# Patient Record
Sex: FEMALE | Race: WHITE
[De-identification: names, ages, dates, MRNs, and addresses within clinical notes are randomized per-mention and may not be internally consistent; named-entity substitution may affect disease eponyms.]

---

## 2017-04-29 ENCOUNTER — HOSPITAL ENCOUNTER (EMERGENCY)
Dept: HOSPITAL 10 - FTE | Age: 32
Discharge: HOME | End: 2017-04-29
Payer: MEDICAID

## 2017-04-29 VITALS
DIASTOLIC BLOOD PRESSURE: 76 MMHG | TEMPERATURE: 98 F | SYSTOLIC BLOOD PRESSURE: 120 MMHG | RESPIRATION RATE: 16 BRPM | HEART RATE: 69 BPM

## 2017-04-29 VITALS — BODY MASS INDEX: 37.24 KG/M2 | WEIGHT: 160.94 LBS | HEIGHT: 55 IN

## 2017-04-29 DIAGNOSIS — R07.89: ICD-10-CM

## 2017-04-29 DIAGNOSIS — R42: Primary | ICD-10-CM

## 2017-04-29 LAB
ADD SCAN DIFF: NO
ADD UMIC: YES
ALBUMIN SERPL-MCNC: 4.1 G/DL (ref 3.3–4.9)
ALBUMIN/GLOB SERPL: 1.24 {RATIO}
ALP SERPL-CCNC: 63 IU/L (ref 42–121)
ALT SERPL-CCNC: 29 IU/L (ref 13–69)
ANION GAP SERPL CALC-SCNC: 15 MMOL/L (ref 8–16)
APTT BLD: 30.3 SEC (ref 25–35)
AST SERPL-CCNC: 19 IU/L (ref 15–46)
BACTERIA #/AREA URNS HPF: (no result) /[HPF]
BASOPHILS # BLD AUTO: 0 10^3/UL (ref 0–0.1)
BASOPHILS NFR BLD: 0.3 % (ref 0–2)
BILIRUB DIRECT SERPL-MCNC: 0 MG/DL (ref 0–0.2)
BILIRUB SERPL-MCNC: 0.2 MG/DL (ref 0.2–1.3)
BUN SERPL-MCNC: 13 MG/DL (ref 7–20)
CALCIUM SERPL-MCNC: 9 MG/DL (ref 8.4–10.2)
CHLORIDE SERPL-SCNC: 102 MMOL/L (ref 97–110)
CO2 SERPL-SCNC: 28 MMOL/L (ref 21–31)
COLOR UR: (no result)
CREAT SERPL-MCNC: 0.7 MG/DL (ref 0.44–1)
D DIMER PPP FEU-MCNC: < 220 NG/ML (ref ?–460)
EOSINOPHIL # BLD: 0 10^3/UL (ref 0–0.5)
EOSINOPHIL NFR BLD: 0.2 % (ref 0–7)
ERYTHROCYTE [DISTWIDTH] IN BLOOD BY AUTOMATED COUNT: 15.4 % (ref 11.5–14.5)
GLOBULIN SER-MCNC: 3.3 G/DL (ref 1.3–3.2)
GLUCOSE SERPL-MCNC: 89 MG/DL (ref 70–220)
GLUCOSE UR STRIP-MCNC: NEGATIVE %
HCT VFR BLD CALC: 31.3 % (ref 37–47)
HGB BLD-MCNC: 9.2 G/DL (ref 12–16)
INR PPP: 0.95
KETONES UR STRIP.AUTO-MCNC: NEGATIVE MG/DL
LYMPHOCYTES # BLD AUTO: 2.6 10^3/UL (ref 0.8–2.9)
LYMPHOCYTES NFR BLD AUTO: 26.9 % (ref 15–51)
MCH RBC QN AUTO: 20.7 PG (ref 29–33)
MCHC RBC AUTO-ENTMCNC: 29.4 G/DL (ref 32–37)
MCV RBC AUTO: 70.3 FL (ref 82–101)
MONOCYTES # BLD: 0.6 10^3/UL (ref 0.3–0.9)
MONOCYTES NFR BLD: 6.5 % (ref 0–11)
NEUTROPHILS # BLD: 6.2 10^3/UL (ref 1.6–7.5)
NEUTROPHILS NFR BLD AUTO: 65.9 % (ref 39–77)
NITRITE UR QL STRIP.AUTO: NEGATIVE
NRBC # BLD MANUAL: 0 10^3/UL (ref 0–0)
NRBC BLD QL: 0 /100WBC (ref 0–0)
PLATELET # BLD: 382 10^3/UL (ref 140–415)
PMV BLD AUTO: 10.2 FL (ref 7.4–10.4)
POTASSIUM SERPL-SCNC: 3.8 MMOL/L (ref 3.5–5.1)
PROT SERPL-MCNC: 7.4 G/DL (ref 6.1–8.1)
PROTHROMBIN TIME: 12.7 SEC (ref 12.2–14.2)
PT RATIO: 1
RBC # BLD AUTO: 4.45 10^6/UL (ref 4.2–5.4)
RBC # UR AUTO: NEGATIVE /UL
RBC #/AREA URNS HPF: (no result) /HPF
SODIUM SERPL-SCNC: 141 MMOL/L (ref 135–144)
SQUAMOUS #/AREA URNS HPF: (no result) /[HPF]
URINE BILIRUBIN (DIP): NEGATIVE
URINE TOTAL PROTEIN (DIP): NEGATIVE
UROBILINOGEN UR STRIP-ACNC: (no result) (ref 0.1–1)
WBC # BLD AUTO: 9.5 10^3/UL (ref 4.8–10.8)
WBC # UR STRIP: (no result) /UL

## 2017-04-29 PROCEDURE — 85378 FIBRIN DEGRADE SEMIQUANT: CPT

## 2017-04-29 PROCEDURE — 80053 COMPREHEN METABOLIC PANEL: CPT

## 2017-04-29 PROCEDURE — 96374 THER/PROPH/DIAG INJ IV PUSH: CPT

## 2017-04-29 PROCEDURE — 81003 URINALYSIS AUTO W/O SCOPE: CPT

## 2017-04-29 PROCEDURE — 93005 ELECTROCARDIOGRAM TRACING: CPT

## 2017-04-29 PROCEDURE — 36415 COLL VENOUS BLD VENIPUNCTURE: CPT

## 2017-04-29 PROCEDURE — 85025 COMPLETE CBC W/AUTO DIFF WBC: CPT

## 2017-04-29 PROCEDURE — 81001 URINALYSIS AUTO W/SCOPE: CPT

## 2017-04-29 PROCEDURE — 85610 PROTHROMBIN TIME: CPT

## 2017-04-29 PROCEDURE — 71010: CPT

## 2017-04-29 PROCEDURE — 85730 THROMBOPLASTIN TIME PARTIAL: CPT

## 2017-04-29 PROCEDURE — 83690 ASSAY OF LIPASE: CPT

## 2017-04-29 NOTE — RADRPT
PROCEDURE:   XR Chest. 

 

CLINICAL INDICATION:   Abdominal pain.

 

TECHNIQUE:   Single frontal view. 

 

COMPARISON:   11/06/2016. 

 

FINDINGS:

The lungs are clear. 

The heart size is normal. 

There is no pleural effusion. 

There is no pneumothorax.   

 

IMPRESSION:

1.  Normal chest radiograph.

2.  No change from 11/06/2016.

 

RPTAT: QQ

_____________________________________________ 

.Mulugeta Daniel MD, MD           Date    Time 

Electronically viewed and signed by .Mulugeta Daniel MD, MD on 04/29/2017 19:03 

 

D:  04/29/2017 19:03  T:  04/29/2017 19:03

.R/

## 2017-04-29 NOTE — ERD
ER Documentation


Chief Complaint


Date/Time


DATE: 4/29/17 


TIME: 19:47


Chief Complaint


DIZZINESS  X 3 DAYS





HPI


This 31-year-old female presents with sensation of left chest wall pain for 

last 3 days.  She has some dizziness today and felt like she was going to pass 

out.  She denies any head injury, vomiting, shortness of breath, fevers, neck 

stiffness, rashes, additional complaints.  Patient is a history significant for 

anemia related to possible gastritis or heavy menstrual periods.  She is taking 

iron at home.  She had a hemoglobin of in the mid eights last week with her 

primary doctor.





ROS


All systems reviewed and are negative except as per history of present illness.





Medications


Home Meds


Active Scripts


Acetaminophen* (Tylophen*) 500 Mg Capsule, 1 CAP PO Q6H Y for PAIN AND OR 

ELEVATED TEMP, #15 CAP


   Prov:DARELL HANSON MD         4/29/17


Meclizine Hcl* (Antivert*) 12.5 Mg Tab, 12.5 MG PO Q6H Y for DIZZINESS, #20 TAB


   Prov:EMELY HUFF PA-C         11/6/16


Ondansetron Hcl* (Zofran*) 4 Mg Tablet, 4 MG PO Q6H for NAUSEA AND/OR VOMITING, 

#30 TAB


   Prov:EMELY HUFF PA-C         11/6/16


Ibuprofen* (Motrin*) 400 Mg Tab, 400 MG PO Q6, #30 TAB


   Prov:EMELY HUFF PA-C         11/6/16


Ferrous Sulfate* (Ferrous Sulfate*) 325 Mg Tabec, 325 MG PO BID for 60 Days, TAB


   Prov:SHERRI STEWART NP         10/8/16


Pantoprazole* (Pantoprazole*) 40 Mg Tablet.dr, 40 MG PO BID@06,18 for 60 Days


   Prov:SHERRI STEWART NP         10/8/16





Allergies


Allergies:  


Coded Allergies:  


     No Known Allergy (Unverified , 10/4/16)





PMhx/Soc


Medical and Surgical Hx:  pt denies Medical Hx, pt denies Surgical Hx


History of Surgery:  No


Anesthesia Reaction:  No (UNKNOWN )


Hx Neurological Disorder:  No


Hx Respiratory Disorders:  No


Hx Cardiac Disorders:  No


Hx Psychiatric Problems:  No


Hx Miscellaneous Medical Probl:  No


Hx Alcohol Use:  No (DENIES )


Hx Substance Use:  No


Hx Tobacco Use:  No


Smoking Status:  Never smoker





Physical Exam


Vitals





Vital Signs








  Date Time  Temp Pulse Resp B/P Pulse Ox O2 Delivery O2 Flow Rate FiO2


 


4/29/17 18:03 98.0 81 18 144/91 99   








Physical Exam


Const:  [] Alert, non-ill-appearing.


Head:   Atraumatic 


Eyes:    Normal Conjunctiva


ENT:    Normal External Ears, Nose and Mouth.


Neck:               Full range of motion..~ No meningismus.


Resp:    Clear to auscultation bilaterally


Cardio:    Regular rate and rhythm, no murmurs.  Reproducible left chest wall 

tenderness in the costochondral area.


Abd:    Soft, non tender, non distended. Normal bowel sounds


Skin:    No petechiae or rashes


Back:    No midline or flank tenderness


Ext:    No cyanosis, or edema.  No calf swelling or Homans sign.


Neur:    Awake and alert


Psych:    Normal Mood and Affect


Result Diagram:  


4/29/17 1849                                                                   

             4/29/17 1849





Results 24 hrs





 Laboratory Tests








Test


  4/29/17


18:49 4/29/17


18:50


 


White Blood Count 9.510^3/ul  


 


Red Blood Count 4.4510^6/ul  


 


Hemoglobin 9.2g/dl  


 


Hematocrit 31.3%  


 


Mean Corpuscular Volume 70.3fl  


 


Mean Corpuscular Hemoglobin 20.7pg  


 


Mean Corpuscular Hemoglobin


Concent 29.4g/dl 


  


 


 


Red Cell Distribution Width 15.4%  


 


Platelet Count 18697^3/UL  


 


Mean Platelet Volume 10.2fl  


 


Neutrophils % 65.9%  


 


Lymphocytes % 26.9%  


 


Monocytes % 6.5%  


 


Eosinophils % 0.2%  


 


Basophils % 0.3%  


 


Nucleated Red Blood Cells % 0.0/100WBC  


 


Neutrophils # 6.210^3/ul  


 


Lymphocytes # 2.610^3/ul  


 


Monocytes # 0.610^3/ul  


 


Eosinophils # 0.010^3/ul  


 


Basophils # 0.010^3/ul  


 


Nucleated Red Blood Cells # 0.010^3/ul  


 


Prothrombin Time 12.7Sec  


 


Prothrombin Time Ratio 1.0  


 


INR International Normalized


Ratio 0.95 


  


 


 


Activated Partial


Thromboplast Time 30.3Sec 


  


 


 


D-Dimer < 220.00ng/ml  


 


D-Dimer Comment   


 


Sodium Level 141mmol/L  


 


Potassium Level 3.8mmol/L  


 


Chloride Level 102mmol/L  


 


Carbon Dioxide Level 28mmol/L  


 


Anion Gap 15  


 


Blood Urea Nitrogen 13mg/dl  


 


Creatinine 0.70mg/dl  


 


Glucose Level 89mg/dl  


 


Calcium Level 9.0mg/dl  


 


Total Bilirubin 0.2mg/dl  


 


Direct Bilirubin 0.00mg/dl  


 


Indirect Bilirubin 0.2mg/dl  


 


Aspartate Amino Transf


(AST/SGOT) 19IU/L 


  


 


 


Alanine Aminotransferase


(ALT/SGPT) 29IU/L 


  


 


 


Alkaline Phosphatase 63IU/L  


 


Total Protein 7.4g/dl  


 


Albumin 4.1g/dl  


 


Globulin 3.30g/dl  


 


Albumin/Globulin Ratio 1.24  


 


Lipase 95U/L  


 


Urine Color  LT. YELLOW 


 


Urine Clarity  CLEAR 


 


Urine pH  5.5 


 


Urine Specific Gravity  <=1.005 


 


Urine Ketones  NEGATIVE 


 


Urine Nitrite  NEGATIVE 


 


Urine Bilirubin  NEGATIVE 


 


Urine Urobilinogen  0.2  E.U./dL 


 


Urine Leukocyte Esterase  1+ 


 


Urine Microscopic RBC  Pending 


 


Urine Microscopic WBC  Pending 


 


Urine Hemoglobin  NEGATIVE 


 


Urine Glucose  NEGATIVE% 


 


Urine Total Protein  NEGATIVE 








 Current Medications








 Medications


  (Trade)  Dose


 Ordered  Sig/Krystle


 Route


 PRN Reason  Start Time


 Stop Time Status Last Admin


Dose Admin


 


 Ondansetron HCl


  (Zofran Inj)  4 mg  ONCE  STAT


 IV


   4/29/17 18:30


 4/29/17 18:33 DC 4/29/17 19:04


 











Procedures/MDM


CBC shows white blood cell count of 9.5 and hemoglobin 9.2 and anemia which is 

microcytic.  CMP is normal.  Urine is negative for infection, glucose nitrites 

and hCG is negative.  D-dimer is negative.


 EKG: 


Rate/Rhythm:             [Normal Sinus Rhythm] rate equals 66


QRS, ST, T-waves:    [No changes consistent w/ acute ischemia]


Impression:      [No evidence of ischemia or arrhythmia]





Chest X-ray 1V Interpreted by me:


Soft Tissue:                                               No acute 

abnormalities


Bones:                                                    No acute abnormalities


Mediastinum/Cardiac Silhouette/Lungs:     [No acute abnormalities].  Impression-

normal 1 view chest x-ray








Patient was given Tylenol 650 mg by mouth.  Patient presents with left chest 

wall pain and signs of costochondritis.  There is no signs to suggest pneumonia

, PE, life-threatening arrhythmias, anemia sufficient to cause patient's 

presenting complaints.  Patient was discharged home instructions for last, 

fluids and further observation and Tylenol for pain.  The patient was stable 

with no new complaints during the ER course. Clinically, there is no current 

evidence to suggest meningitis, sepsis, acute abdomen, pneumonia, acute 

coronary syndrome, pulmonary embolism, or any other emergent condition 

appearing to require further evaluation or hospitalization. The patient should 

certainly return for any new or worsening symptoms per the aftercare 

instructions. They should otherwise follow-up with her primary care doctor for 

reevaluation this week.





Departure


Diagnosis:  


 Primary Impression:  


 Dizziness


 Additional Impression:  


 Chest wall pain


Condition:  Stable


Patient Instructions:  Anemia, Iron Deficiency (Adult), Chest Wall Pain, 

Costochondritis, Dizziness, Unk Cause





Additional Instructions:  


Examines normal hoy. Cheque otro vez con fisher doctor primario en el proximo shah 

or regresa para mas o nueva simptomas. CONTINUA LLERON. KATHLEEN AGUA EN CASA.











DARELL HANSON MD Apr 29, 2017 19:49

## 2017-09-11 ENCOUNTER — HOSPITAL ENCOUNTER (OUTPATIENT)
Dept: HOSPITAL 10 - E/R | Age: 32
Setting detail: OBSERVATION
LOS: 1 days | Discharge: HOME | End: 2017-09-12
Attending: INTERNAL MEDICINE | Admitting: INTERNAL MEDICINE
Payer: MEDICAID

## 2017-09-11 VITALS — HEART RATE: 75 BPM | DIASTOLIC BLOOD PRESSURE: 77 MMHG | SYSTOLIC BLOOD PRESSURE: 115 MMHG | RESPIRATION RATE: 18 BRPM

## 2017-09-11 VITALS — SYSTOLIC BLOOD PRESSURE: 111 MMHG | DIASTOLIC BLOOD PRESSURE: 72 MMHG | HEART RATE: 72 BPM | RESPIRATION RATE: 18 BRPM

## 2017-09-11 VITALS
BODY MASS INDEX: 27.18 KG/M2 | BODY MASS INDEX: 27.18 KG/M2 | HEIGHT: 65 IN | WEIGHT: 163.14 LBS | HEIGHT: 65 IN | WEIGHT: 163.14 LBS

## 2017-09-11 VITALS — SYSTOLIC BLOOD PRESSURE: 119 MMHG | RESPIRATION RATE: 18 BRPM | DIASTOLIC BLOOD PRESSURE: 67 MMHG

## 2017-09-11 VITALS — DIASTOLIC BLOOD PRESSURE: 75 MMHG | HEART RATE: 74 BPM | SYSTOLIC BLOOD PRESSURE: 118 MMHG | RESPIRATION RATE: 18 BRPM

## 2017-09-11 VITALS — TEMPERATURE: 98.7 F

## 2017-09-11 DIAGNOSIS — D50.9: Primary | ICD-10-CM

## 2017-09-11 LAB
%HYPO/RBC NFR BLD AUTO: (no result) % (ref 0–0)
ABNORMAL IP MESSAGE: 1
ALBUMIN SERPL-MCNC: 3.6 G/DL (ref 3.3–4.9)
ALBUMIN/GLOB SERPL: 1.2 {RATIO}
ALP SERPL-CCNC: 63 IU/L (ref 42–121)
ALT SERPL-CCNC: 28 IU/L (ref 13–69)
ANION GAP SERPL CALC-SCNC: 10 MMOL/L (ref 8–16)
APTT BLD: 28.1 SEC (ref 25–35)
AST SERPL-CCNC: 20 IU/L (ref 15–46)
BILIRUB DIRECT SERPL-MCNC: 0 MG/DL (ref 0–0.2)
BILIRUB SERPL-MCNC: 0.1 MG/DL (ref 0.2–1.3)
BUN SERPL-MCNC: 11 MG/DL (ref 7–20)
CALCIUM SERPL-MCNC: 8.6 MG/DL (ref 8.4–10.2)
CHLORIDE SERPL-SCNC: 106 MMOL/L (ref 97–110)
CO2 SERPL-SCNC: 27 MMOL/L (ref 21–31)
CREAT SERPL-MCNC: 0.76 MG/DL (ref 0.44–1)
ERYTHROCYTE [DISTWIDTH] IN BLOOD BY AUTOMATED COUNT: 17.8 % (ref 11.5–14.5)
GLOBULIN SER-MCNC: 3 G/DL (ref 1.3–3.2)
GLUCOSE SERPL-MCNC: 95 MG/DL (ref 70–220)
HCT VFR BLD CALC: 24.5 % (ref 37–47)
HGB BLD-MCNC: 6.7 G/DL (ref 12–16)
INR PPP: 1
LYMPHOCYTES # BLD AUTO: 2.4 10^3/UL (ref 0.8–2.9)
MCH RBC QN AUTO: 16.8 PG (ref 29–33)
MCHC RBC AUTO-ENTMCNC: 27.3 G/DL (ref 32–37)
MCV RBC AUTO: 61.6 FL (ref 82–101)
MONOCYTES # BLD: 0.5 10^3/UL (ref 0.3–0.9)
MONOCYTES % (M): 7 % (ref 0–11)
NRBC BLD AUTO-RTO: 0 /100WBC (ref 0–0)
OVALOCYTES BLD QL SMEAR: (no result) (ref 0–0)
PLATELET # BLD: 492 10^3/UL (ref 140–415)
PMV BLD AUTO: 9.8 FL (ref 7.4–10.4)
POSITIVE DIFF: (no result)
POTASSIUM SERPL-SCNC: 3.7 MMOL/L (ref 3.5–5.1)
PROT SERPL-MCNC: 6.6 G/DL (ref 6.1–8.1)
PROTHROMBIN TIME: 13.2 SEC (ref 12.2–14.2)
PT RATIO: 1
RBC # BLD AUTO: 3.98 10^6/UL (ref 4.2–5.4)
SODIUM SERPL-SCNC: 139 MMOL/L (ref 135–144)
WBC # BLD AUTO: 6.7 10^3/UL (ref 4.8–10.8)

## 2017-09-11 PROCEDURE — 85025 COMPLETE CBC W/AUTO DIFF WBC: CPT

## 2017-09-11 PROCEDURE — 84703 CHORIONIC GONADOTROPIN ASSAY: CPT

## 2017-09-11 PROCEDURE — 86901 BLOOD TYPING SEROLOGIC RH(D): CPT

## 2017-09-11 PROCEDURE — 83735 ASSAY OF MAGNESIUM: CPT

## 2017-09-11 PROCEDURE — P9016 RBC LEUKOCYTES REDUCED: HCPCS

## 2017-09-11 PROCEDURE — 86920 COMPATIBILITY TEST SPIN: CPT

## 2017-09-11 PROCEDURE — 84100 ASSAY OF PHOSPHORUS: CPT

## 2017-09-11 PROCEDURE — 36415 COLL VENOUS BLD VENIPUNCTURE: CPT

## 2017-09-11 PROCEDURE — 80053 COMPREHEN METABOLIC PANEL: CPT

## 2017-09-11 PROCEDURE — 85730 THROMBOPLASTIN TIME PARTIAL: CPT

## 2017-09-11 PROCEDURE — 85610 PROTHROMBIN TIME: CPT

## 2017-09-11 PROCEDURE — 96365 THER/PROPH/DIAG IV INF INIT: CPT

## 2017-09-11 PROCEDURE — 80048 BASIC METABOLIC PNL TOTAL CA: CPT

## 2017-09-11 PROCEDURE — 96375 TX/PRO/DX INJ NEW DRUG ADDON: CPT

## 2017-09-11 PROCEDURE — 86850 RBC ANTIBODY SCREEN: CPT

## 2017-09-11 PROCEDURE — 86900 BLOOD TYPING SEROLOGIC ABO: CPT

## 2017-09-11 PROCEDURE — 82728 ASSAY OF FERRITIN: CPT

## 2017-09-11 PROCEDURE — 82270 OCCULT BLOOD FECES: CPT

## 2017-09-11 PROCEDURE — 83036 HEMOGLOBIN GLYCOSYLATED A1C: CPT

## 2017-09-11 PROCEDURE — 36430 TRANSFUSION BLD/BLD COMPNT: CPT

## 2017-09-11 PROCEDURE — 83540 ASSAY OF IRON: CPT

## 2017-09-11 NOTE — ERA
ER Documentation


Chief Complaint


Date/Time


DATE: 9/11/17 


TIME: 14:40


Chief Complaint


 LOW HEMAGLOBIN





HPI


The patient is a 41-year-old female, presenting because of low hemoglobin of 6 

3 days ago at the clinic.  She had history of anemia, last blood transfusion 

was about a year ago.  She complains of chronic generalized weakness, denies 

syncope, near syncope, neck pain, chest pain, dyspnea, abdominal pain, vomiting

, dysuria, diarrhea, hematuria, hematochezia.  She does not smoke nor drink





Past medical history: Anemia, vitamin D deficiency, history of esophagitis, 

gastritis, duodenitis





Past surgical history none





ROS


All systems reviewed and are negative except as per history of present illness.





Medications


Home Meds


Active Scripts


Ferrous Sulfate* (Ferrous Sulfate*) 325 Mg Tabec, 325 MG PO BID for 60 Days, TAB


   Prov:SHERRI STEWART NP         10/8/16


Reported Medications


Ascorbic Acid (Vitamin C) Unknown Strength Tab, 1 TAB PO TID, TAB


   9/11/17


Discontinued Scripts


Pantoprazole* (Protonix*) 40 Mg Tablet., 40 MG PO DAILY, #30 TAB


   Prov:DARELL HANSON MD         4/29/17


Acetaminophen* (Tylophen*) 500 Mg Capsule, 1 CAP PO Q6H Y for PAIN AND OR 

ELEVATED TEMP, #15 CAP


   Prov:DARELL HANSON MD         4/29/17


Meclizine Hcl* (Antivert*) 12.5 Mg Tab, 12.5 MG PO Q6H Y for DIZZINESS, #20 TAB


   Prov:EMELY HUFF PA-C         11/6/16


Ondansetron Hcl* (Zofran*) 4 Mg Tablet, 4 MG PO Q6H for NAUSEA AND/OR VOMITING, 

#30 TAB


   Prov:EMELY HUFF-C         11/6/16


Ibuprofen* (Motrin*) 400 Mg Tab, 400 MG PO Q6, #30 TAB


   Prov:EMELY HUFF-C         11/6/16


Pantoprazole* (Pantoprazole*) 40 Mg Tablet., 40 MG PO BID@06,18 for 60 Days


   Prov:SHERRI STEWART NP         10/8/16





Allergies


Allergies:  


Coded Allergies:  


     No Known Allergy (Unverified , 9/11/17)





PMhx/Soc


History of Surgery:  No


Anesthesia Reaction:  No (UNKNOWN )


Hx Neurological Disorder:  No


Hx Respiratory Disorders:  No


Hx Cardiac Disorders:  No


Hx Psychiatric Problems:  No


Hx Miscellaneous Medical Probl:  No


Hx Alcohol Use:  No (DENIES )


Hx Substance Use:  No


Hx Tobacco Use:  No





Physical Exam


Vitals





Vital Signs








  Date Time  Temp Pulse Resp B/P Pulse Ox O2 Delivery O2 Flow Rate FiO2


 


9/11/17 15:30      Nasal Cannula 2 


 


9/11/17 13:23 99.0 106 22 130/84 99   








Physical Exam


 Const:      No acute distress.


 Head:        Atraumatic.


 Eyes:       Normal Conjunctiva.


 ENT:         Normal External Ears, Nose and Mouth.


 Neck:        Full range of motion.  No meningismus.


 Resp:         Clear to auscultation bilaterally.


 Cardio:       Regular rate and rhythm.


 Abd:         Soft,  non distended, normal bowel sounds, non tender.


 Skin:         No petechiae or rashes.


 Back:        No midline or flank tenderness.


 Ext:          No cyanosis, or edema.


 Neur:        Awake and alert. No focal deficit


 Psych:        Normal Mood and Affect.


Result Diagram:  


9/11/17 1502                                                                   

             9/11/17 1502





Results 24 hrs





 Laboratory Tests








Test


  9/11/17


15:02 9/11/17


15:10


 


White Blood Count 6.710^3/ul  


 


Red Blood Count 3.9810^6/ul  


 


Hemoglobin 6.7g/dl  


 


Hematocrit 24.5%  


 


Mean Corpuscular Volume 61.6fl  


 


Mean Corpuscular Hemoglobin 16.8pg  


 


Mean Corpuscular Hemoglobin


Concent 27.3g/dl 


  


 


 


Red Cell Distribution Width 17.8%  


 


Platelet Count 50763^3/UL  


 


Mean Platelet Volume 9.8fl  


 


Neutrophils % %  


 


Segmented Neutrophils %


(Manual) 57% 


  


 


 


Lymphocytes % %  


 


Lymphocytes % (Manual) 36%  


 


Monocytes % %  


 


Monocytes % (Manual) 7%  


 


Eosinophils % %  


 


Basophils % %  


 


Nucleated Red Blood Cells % 0.0/100WBC  


 


Neutrophils # (Manual) 10^3/ul  


 


Absolute Lymphocytes (Manual) 2.410^3/ul  


 


Lymphocytes # 2.410^3/ul  


 


Monocytes # 0.510^3/ul  


 


Absolute Monocytes (Manual) 0.410^3/ul  


 


Eosinophils # 10^3/ul  


 


Basophils # 10^3/ul  


 


Nucleated Red Blood Cells # 10^3/ul  


 


Hypochromasia 2+  


 


Ovalocytes 1+  


 


Prothrombin Time 13.2Sec  


 


Prothrombin Time Ratio 1.0  


 


INR International Normalized


Ratio 1.00 


  


 


 


Activated Partial


Thromboplast Time 28.1Sec 


  


 


 


Sodium Level 139mmol/L  


 


Potassium Level 3.7mmol/L  


 


Chloride Level 106mmol/L  


 


Carbon Dioxide Level 27mmol/L  


 


Anion Gap 10  


 


Blood Urea Nitrogen 11mg/dl  


 


Creatinine 0.76mg/dl  


 


Glucose Level 95mg/dl  


 


Calcium Level 8.6mg/dl  


 


Total Bilirubin 0.1mg/dl  


 


Direct Bilirubin 0.00mg/dl  


 


Indirect Bilirubin 0.1mg/dl  


 


Aspartate Amino Transf


(AST/SGOT) 20IU/L 


  


 


 


Alanine Aminotransferase


(ALT/SGPT) 28IU/L 


  


 


 


Alkaline Phosphatase 63IU/L  


 


Total Protein 6.6g/dl  


 


Albumin 3.6g/dl  


 


Globulin 3.00g/dl  


 


Albumin/Globulin Ratio 1.20  


 


Stool Occult Blood  NEGATIVE 











Procedures/MDM


MEDICAL MAKING DECISION: The patient is a 31-year-old female, presenting with 

acute symptomatic anemia, most likely due to history of esophagitis, gastritis 

and duodenitis.  She was treated with 2unit pRBC.





The differential diagnoses considered include but are not limited to gastritis, 

peptic ulcer disease, esophageal varices, [place] tear,  polyp, hemorrhoid

, fissure, diverticulosis, angiodysplasia.





Departure


Diagnosis:  


 Primary Impression:  


 Symptomatic anemia


Condition:  Stable


Comments


I discussed the findings with the patient. I discussed the patient with the 

hospitalist Dr. Montoya at 4:20 PM who was made aware of the lab, the treatment

, the patient condition. The patient is admitted to [facility] for 24 hour 

observation





The patient's blood pressure was elevated (>120/80) but appears stable without 

evidence of hypertension emergency or urgency.  The patient was counseled about 

the risks of hypertension and urged to pursue outpatient monitoring and therapy 

within a week with their primary care physician.











JAN WHITLEY MD Sep 11, 2017 14:41

## 2017-09-12 VITALS — SYSTOLIC BLOOD PRESSURE: 109 MMHG | RESPIRATION RATE: 18 BRPM | DIASTOLIC BLOOD PRESSURE: 74 MMHG

## 2017-09-12 VITALS — SYSTOLIC BLOOD PRESSURE: 118 MMHG | DIASTOLIC BLOOD PRESSURE: 69 MMHG | HEART RATE: 70 BPM

## 2017-09-12 VITALS — DIASTOLIC BLOOD PRESSURE: 69 MMHG | SYSTOLIC BLOOD PRESSURE: 103 MMHG | RESPIRATION RATE: 18 BRPM

## 2017-09-12 VITALS — DIASTOLIC BLOOD PRESSURE: 73 MMHG | RESPIRATION RATE: 17 BRPM | SYSTOLIC BLOOD PRESSURE: 111 MMHG

## 2017-09-12 LAB
ABNORMAL IP MESSAGE: 1
ANION GAP SERPL CALC-SCNC: 7 MMOL/L (ref 8–16)
BASOPHILS # BLD AUTO: 0.1 10^3/UL (ref 0–0.1)
BASOPHILS NFR BLD: 0.6 % (ref 0–2)
BUN SERPL-MCNC: 10 MG/DL (ref 7–20)
CALCIUM SERPL-MCNC: 8.3 MG/DL (ref 8.4–10.2)
CHLORIDE SERPL-SCNC: 108 MMOL/L (ref 97–110)
CO2 SERPL-SCNC: 28 MMOL/L (ref 21–31)
CREAT SERPL-MCNC: 0.71 MG/DL (ref 0.44–1)
EOSINOPHIL # BLD: 0 10^3/UL (ref 0–0.5)
EOSINOPHIL NFR BLD: 0.4 % (ref 0–7)
ERYTHROCYTE [DISTWIDTH] IN BLOOD BY AUTOMATED COUNT: 24.2 % (ref 11.5–14.5)
GLUCOSE SERPL-MCNC: 94 MG/DL (ref 70–220)
HCT VFR BLD CALC: 29.1 % (ref 37–47)
HGB BLD-MCNC: 8.5 G/DL (ref 12–16)
IRON SERPL-MCNC: 14 UG/DL (ref 35–150)
LYMPHOCYTES # BLD AUTO: 2.2 10^3/UL (ref 0.8–2.9)
LYMPHOCYTES NFR BLD AUTO: 27 % (ref 15–51)
MAGNESIUM SERPL-MCNC: 2.1 MG/DL (ref 1.7–2.5)
MCH RBC QN AUTO: 19.6 PG (ref 29–33)
MCHC RBC AUTO-ENTMCNC: 29.2 G/DL (ref 32–37)
MCV RBC AUTO: 67.2 FL (ref 82–101)
MONOCYTES # BLD: 0.9 10^3/UL (ref 0.3–0.9)
MONOCYTES NFR BLD: 10.6 % (ref 0–11)
NEUTROPHILS NFR BLD AUTO: 61 % (ref 39–77)
NRBC # BLD MANUAL: 0 10^3/UL (ref 0–0)
NRBC BLD AUTO-RTO: 0 /100WBC (ref 0–0)
PHOSPHATE SERPL-MCNC: 3.9 MG/DL (ref 2.5–4.9)
PLATELET # BLD: 455 10^3/UL (ref 140–415)
PMV BLD AUTO: 10.6 FL (ref 7.4–10.4)
POSITIVE DIFF: (no result)
POTASSIUM SERPL-SCNC: 4.2 MMOL/L (ref 3.5–5.1)
RBC # BLD AUTO: 4.33 10^6/UL (ref 4.2–5.4)
SODIUM SERPL-SCNC: 139 MMOL/L (ref 135–144)
TIBC SERPL-MCNC: 390 UG/DL (ref 241–421)
WBC # BLD AUTO: 8.1 10^3/UL (ref 4.8–10.8)

## 2017-09-12 RX ADMIN — FERROUS SULFATE TAB 325 MG (65 MG ELEMENTAL FE) SCH MG: 325 (65 FE) TAB at 12:15

## 2017-09-12 RX ADMIN — FERROUS SULFATE TAB 325 MG (65 MG ELEMENTAL FE) SCH MG: 325 (65 FE) TAB at 09:23

## 2017-09-12 NOTE — HP
Date/Time of Note


Date/Time of Note


DATE: 9/12/17 


TIME: 22:28





Assessment/Plan


Lines/Catheters


IV Catheter Type (from Nrs):  Saline Lock





HPI/ROS


Admit Date/Time


Admit Date/Time


Sep 11, 2017 at 16:20





PMH/Family/Social


Past Surgical History


Past Surgical Hx:  no surgical history





Social History


Smoking Status:  Former smoker





Exam/Review of Systems


Vital Signs


Vitals





 Vital Signs








  Date Time  Temp Pulse Resp B/P Pulse Ox O2 Delivery O2 Flow Rate FiO2


 


9/12/17 14:31 98.4 66 17 111/73 98   


 


9/11/17 21:01      Room Air  


 


9/11/17 17:55       2.0 














 Intake and Output   


 


 9/11/17 9/11/17 9/12/17





 15:00 23:00 07:00


 


Intake Total  280 ml 1060 ml


 


Output Total   2 ml


 


Balance  280 ml 1058 ml











Labs


Result Diagram:  


9/12/17 0527                                                                   

             9/12/17 0527














LANA EDGAR MD Sep 12, 2017 22:28

## 2017-09-12 NOTE — DS
Date/Time of Note


Date/Time of Note


DATE: 9/12/17 


TIME: 14:19





Discharge Summary


Admission/Discharge Info


Admit Date/Time


Sep 11, 2017 at 16:20


Discharge Date/Time





Discharge Diagnosis


1. Iron deficiency anemia, likely iron absorption issue, 2 units PRBCs, 

hematology outpatient


Patient Condition:  Stable


Hospital Course


31 years old female with chronic iron deficiency anemia that she had full work 

up by an GYN including pelvic ultrasound that was unremarkable. She has normal 

period in a 30 days cycle, 4-5 days bleeding. Stool OB is negative during this 

admission. on admission, . Fe 14, Fe sat 4%, TIBC 390. ferritin 4.3 indicates 

iron deficiency anemia.





H/H/MCV were 6.7/24.5/61.6%, that improve to 8.5/29.1/67.2 after two units of 

PRBC transfusion. Patient gets one dose of iv iron supplement and instructed to 

follow up with PCP and needs to see a hematologist through her PCP's referral.


Home Meds


Active Scripts


Ferrous Sulfate* (Ferrous Sulfate*) 325 Mg Tabec, 325 MG PO BID for 60 Days, TAB


   Prov:SHERRI STEWART NP         10/8/16


Reported Medications


Ascorbic Acid (Vitamin C) Unknown Strength Tab, 1 TAB PO TID, TAB


   9/11/17


Discontinued Scripts


Pantoprazole* (Protonix*) 40 Mg Tablet., 40 MG PO DAILY, #30 TAB


   Prov:DARELL HANSON MD         4/29/17


Acetaminophen* (Tylophen*) 500 Mg Capsule, 1 CAP PO Q6H Y for PAIN AND OR 

ELEVATED TEMP, #15 CAP


   Prov:DARELL HANSON MD         4/29/17


Meclizine Hcl* (Antivert*) 12.5 Mg Tab, 12.5 MG PO Q6H Y for DIZZINESS, #20 TAB


   Prov:EMELY HUFF PA-C         11/6/16


Ondansetron Hcl* (Zofran*) 4 Mg Tablet, 4 MG PO Q6H for NAUSEA AND/OR VOMITING, 

#30 TAB


   Prov:EMELY HUFF PA-C         11/6/16


Ibuprofen* (Motrin*) 400 Mg Tab, 400 MG PO Q6, #30 TAB


   Prov:EMELY HUFF PA-C         11/6/16


Pantoprazole* (Pantoprazole*) 40 Mg Tablet., 40 MG PO BID@06,18 for 60 Days


   Prov:SHERRI STEWART NP         10/8/16


Follow-up Plan


PCP in one week


Hematology per PCP


Primary Care Provider


Jaylen Roper


Pending Labs





Laboratory Tests








Test


  9/11/17


15:02 9/11/17


15:10 9/11/17


23:30 9/12/17


05:27


 


White Blood Count


  6.710^3/ul


(4.8-10.8) 


  


  8.110^3/ul


(4.8-10.8)


 


Red Blood Count


  3.9810^6/ul


(4.20-5.40) 


  


  4.3310^6/ul


(4.20-5.40)


 


Hemoglobin


  6.7g/dl


(12.0-16.0) 


  


  8.5g/dl


(12.0-16.0)


 


Hematocrit


  24.5%


(37.0-47.0) 


  


  29.1%


(37.0-47.0)


 


Mean Corpuscular Volume


  61.6fl


(82.0-101.0) 


  


  67.2fl


(82.0-101.0)


 


Mean Corpuscular Hemoglobin


  16.8pg


(29.0-33.0) 


  


  19.6pg


(29.0-33.0)


 


Mean Corpuscular Hemoglobin


Concent 27.3g/dl


(32.0-37.0) 


  


  29.2g/dl


(32.0-37.0)


 


Red Cell Distribution Width


  17.8%


(11.5-14.5) 


  


  24.2%


(11.5-14.5)


 


Platelet Count


  50656^3/UL


(140-415) 


  


  09117^3/UL


(140-415)


 


Mean Platelet Volume


  9.8fl


(7.4-10.4) 


  


  10.6fl


(7.4-10.4)


 


Neutrophils %


  % (39.0-77.0) 


  


  


  61.0%


(39.0-77.0)


 


Segmented Neutrophils %


(Manual) 57% (39-77) 


  


  


  


 


 


Lymphocytes %


  % (15.0-51.0) 


  


  


  27.0%


(15.0-51.0)


 


Lymphocytes % (Manual) 36% (15-51)    


 


Monocytes %


  % (0.0-11.0) 


  


  


  10.6%


(0.0-11.0)


 


Monocytes % (Manual) 7% (0-11)    


 


Eosinophils % % (0.0-7.0)    0.4% (0.0-7.0) 


 


Basophils % % (0.0-2.0)    0.6% (0.0-2.0) 


 


Nucleated Red Blood Cells %


  0.0/100WBC


(0.0-0.0) 


  


  0.0/100WBC


(0.0-0.0)


 


Neutrophils # (Manual)


  10^3/ul


(1.7-7.5) 


  


  4.910^3/ul


(1.7-7.5)


 


Absolute Lymphocytes (Manual)


  2.410^3/ul


(0.8-2.9) 


  


  


 


 


Lymphocytes #


  2.410^3/ul


(0.8-2.9) 


  


  2.210^3/ul


(0.8-2.9)


 


Monocytes #


  0.510^3/ul


(0.3-0.9) 


  


  0.910^3/ul


(0.3-0.9)


 


Absolute Monocytes (Manual)


  0.410^3/ul


(0.3-0.9) 


  


  


 


 


Eosinophils #


  10^3/ul


(0.0-0.5) 


  


  0.010^3/ul


(0.0-0.5)


 


Basophils #


  10^3/ul


(0.0-0.1) 


  


  0.110^3/ul


(0.0-0.1)


 


Nucleated Red Blood Cells #


  10^3/ul


(0.0-0.0) 


  


  0.010^3/ul


(0.0-0.0)


 


Pathologist Review


(Hematology)  


  


  


  


 


 


Hypochromasia 2+ (0-0)    


 


Ovalocytes 1+ (0-0)    


 


Prothrombin Time


  13.2Sec


(12.2-14.2) 


  


  


 


 


Prothrombin Time Ratio 1.0    


 


INR International Normalized


Ratio 1.00 


  


  


  


 


 


Activated Partial


Thromboplast Time 28.1Sec


(25.0-35.0) 


  


  


 


 


Path Consult Signing


Pathologist KAY STRANGE MD 


  


  


 


 


Sodium Level


  139mmol/L


(135-144) 


  


  139mmol/L


(135-144)


 


Potassium Level


  3.7mmol/L


(3.5-5.1) 


  


  4.2mmol/L


(3.5-5.1)


 


Chloride Level


  106mmol/L


() 


  


  108mmol/L


()


 


Carbon Dioxide Level


  27mmol/L


(21-31) 


  


  28mmol/L


(21-31)


 


Anion Gap 10 (8-16)    7 (8-16) 


 


Blood Urea Nitrogen 11mg/dl (7-20)    10mg/dl (7-20) 


 


Creatinine


  0.76mg/dl


(0.44-1.00) 


  


  0.71mg/dl


(0.44-1.00)


 


Glucose Level


  95mg/dl


() 


  


  94mg/dl


()


 


Calcium Level


  8.6mg/dl


(8.4-10.2) 


  


  8.3mg/dl


(8.4-10.2)


 


Total Bilirubin


  0.1mg/dl


(0.2-1.3) 


  


  


 


 


Direct Bilirubin


  0.00mg/dl


(0.00-0.20) 


  


  


 


 


Indirect Bilirubin


  0.1mg/dl


(0-1.1) 


  


  


 


 


Aspartate Amino Transf


(AST/SGOT) 20IU/L (15-46) 


  


  


  


 


 


Alanine Aminotransferase


(ALT/SGPT) 28IU/L (13-69) 


  


  


  


 


 


Alkaline Phosphatase


  63IU/L


() 


  


  


 


 


Total Protein


  6.6g/dl


(6.1-8.1) 


  


  


 


 


Albumin


  3.6g/dl


(3.3-4.9) 


  


  


 


 


Globulin


  3.00g/dl


(1.3-3.2) 


  


  


 


 


Albumin/Globulin Ratio 1.20    


 


Stool Occult Blood


  


  NEGATIVE


(NEGATIVE) 


  


 


 


Urine Pregnancy Test


  


  


  NEGATIVE


(NEGATIVE) 


 


 


Hemoglobin A1c    5.2% (0-5.9) 


 


Phosphorus Level


  


  


  


  3.9mg/dl


(2.5-4.9)


 


Magnesium Level


  


  


  


  2.1mg/dl


(1.7-2.5)














Test


  9/12/17


05:29 9/12/17


05:30 9/12/17


05:38 


 


 


Iron Level


  14ug/dl


() 


  


  


 


 


Total Iron Binding Capacity


  390ug/dl


(241-421) 


  


  


 


 


Percent Iron Saturation 4% SAT (22-52)    


 


Lab Scanned Report


  


  BLOOD


[id number] 


  


 


 


Ferritin


  


  


  4.3ng/ml


(6.2-137.0) 


 

















RADHA GRANT MD Sep 12, 2017 14:28

## 2017-09-12 NOTE — CONS
Date/Time of Note


Date/Time of Note


DATE: 9/12/17 


TIME: 11:09


VK LE





Assessment/Plan


Assessment/Plan


Chief Complaint/Hosp Course


Iron deficiency anemia, likely iron absorption issue, 


                 POST 2 units PRBCs,


                 hematology F-UP  outpatient FOR IV IRON


                 HX chronic iron deficiency anemia


                 POST  GYN W-UP including pelvic ultrasound that was 

unremarkable. 


                 She has normal period in a 30 days cycle, 4-5 days bleeding. 


                 Stool OB is negative during this admission. on admission, .


                 Fe 14, Fe sat 4%, TIBC 390. ferritin 4.3 indicates iron 

deficiency anemia.


                 OK TO DC WITH OUTPT F-UP


Problems:  





Consultation Date/Type/Reason


Admit Date/Time


Sep 11, 2017 at 16:20


Date of Consultation:  Sep 12, 2017


Type of Consultation:  HEMEONC


Reason for Consultation


ANEMIA


Referring Provider:  JR RAI of Present Illness


The patient is a 41-year-old female, presenting because of low hemoglobin of 6 

3 days ago at the clinic.  She had history of anemia, last blood transfusion 

was about a year ago.  She complains of chronic generalized weakness, denies 

syncope, near syncope, neck pain, chest pain, dyspnea, abdominal pain, vomiting

, dysuria, diarrhea, hematuria, hematochezia.  She does not smoke nor drink





Past medical history: Anemia, vitamin D deficiency, history of esophagitis, 

gastritis, duodenitis





Past surgical history none





ROS


All systems reviewed and are negative except as per history of present illness.





Medications


Home Meds


Active Scripts


Ferrous Sulfate* (Ferrous Sulfate*) 325 Mg Tabec, 325 MG PO BID for 60 Days, TAB


   Prov:SHERRI STEWART NP         10/8/16


Reported Medications


Ascorbic Acid (Vitamin C) Unknown Strength Tab, 1 TAB PO TID, TAB


   9/11/17


Discontinued Scripts


Pantoprazole* (Protonix*) 40 Mg Tablet.dr, 40 MG PO DAILY, #30 TAB


   Prov:DARELL HANSON MD         4/29/17


Acetaminophen* (Tylophen*) 500 Mg Capsule, 1 CAP PO Q6H Y for PAIN AND OR 

ELEVATED TEMP, #15 CAP


   Prov:DARELL HANSON MD         4/29/17


Meclizine Hcl* (Antivert*) 12.5 Mg Tab, 12.5 MG PO Q6H Y for DIZZINESS, #20 TAB


   Prov:EMELY HUFF PA-C         11/6/16


Ondansetron Hcl* (Zofran*) 4 Mg Tablet, 4 MG PO Q6H for NAUSEA AND/OR VOMITING, 

#30 TAB


   Prov:REFUGIOEMELY POLANCO         11/6/16


Ibuprofen* (Motrin*) 400 Mg Tab, 400 MG PO Q6, #30 TAB


   Prov:ATILIO HUFFTANG POLANCO         11/6/16


Pantoprazole* (Pantoprazole*) 40 Mg Tablet.dr, 40 MG PO BID@06,18 for 60 Days


   Prov:SHERRI STEWART NP         10/8/16





Allergies


Allergies:  


Coded Allergies:  


     No Known Allergy (Unverified , 9/11/17)





PMhx/Soc


History of Surgery:  No


Anesthesia Reaction:  No (UNKNOWN )


Hx Neurological Disorder:  No


Hx Respiratory Disorders:  No


Hx Cardiac Disorders:  No


Hx Psychiatric Problems:  No


Hx Miscellaneous Medical Probl:  No


Hx Alcohol Use:  No (DENIES )


Hx Substance Use:  No


Hx Tobacco Use:  No





Physical Exam


Vitals








Past Surgical History


Past Surgical Hx:  no surgical history





Social History


Smoking Status:  Former smoker





Exam/Review of Systems


Vital Signs


Vitals





 Vital Signs








  Date Time  Temp Pulse Resp B/P Pulse Ox O2 Delivery O2 Flow Rate FiO2


 


9/12/17 14:31 98.4 66 17 111/73 98   


 


9/11/17 21:01      Room Air  


 


9/11/17 17:55       2.0 














 Intake and Output   


 


 9/11/17 9/11/17 9/12/17





 15:00 23:00 07:00


 


Intake Total  280 ml 1060 ml


 


Output Total   2 ml


 


Balance  280 ml 1058 ml











Exam





Physical Exam


 Const:      No acute distress.


 Head:        Atraumatic.


 Eyes:       Normal Conjunctiva.


 ENT:         Normal External Ears, Nose and Mouth.


 Neck:        Full range of motion.  No meningismus.


 Resp:         Clear to auscultation bilaterally.


 Cardio:       Regular rate and rhythm.


 Abd:         Soft,  non distended, normal bowel sounds, non tender.


 Skin:         No petechiae or rashes.


 Back:        No midline or flank tenderness.


 Ext:          No cyanosis, or edema.


 Neur:        Awake and alert. No focal deficit





Results


9/11/17 1502                                                                   

             9/11/17 1502





Results 24 hrs





 Laboratory Tests








Test


  9/11/17


15:02 9/11/17


15:10


 


White Blood Count 6.710^3/ul  


 


Red Blood Count 3.9810^6/ul  


 


Hemoglobin 6.7g/dl  


 


Hematocrit 24.5%  


 


Mean Corpuscular Volume 61.6fl  


 


Mean Corpuscular Hemoglobin 16.8pg  


 


Mean Corpuscular Hemoglobin


Concent 27.3g/dl 


  


 


 


Red Cell Distribution Width 17.8%  


 


Platelet Count 53960^3/UL  


 


Mean Platelet Volume 9.8fl  


 


Neutrophils % %  


 


Segmented Neutrophils %


(Manual) 57% 


  


 


 


Lymphocytes % %  


 


Lymphocytes % (Manual) 36%  


 


Monocytes % %  


 


Monocytes % (Manual) 7%  


 


Eosinophils % %  


 


Basophils % %  


 


Nucleated Red Blood Cells % 0.0/100WBC  


 


Neutrophils # (Manual) 10^3/ul  


 


Absolute Lymphocytes (Manual) 2.410^3/ul  


 


Lymphocytes # 2.410^3/ul  


 


Monocytes # 0.510^3/ul  


 


Absolute Monocytes (Manual) 0.410^3/ul  


 


Eosinophils # 10^3/ul  


 


Basophils # 10^3/ul  


 


Nucleated Red Blood Cells # 10^3/ul  


 


Hypochromasia 2+  


 


Ovalocytes 1+  


 


Prothrombin Time 13.2Sec  


 


Prothrombin Time Ratio 1.0  


 


INR International Normalized


Ratio 1.00 


  


 


 


Activated Partial


Thromboplast Time 28.1Sec 


  


 


 


Sodium Level 139mmol/L  


 


Potassium Level 3.7mmol/L  


 


Chloride Level 106mmol/L  


 


Carbon Dioxide Level 27mmol/L  


 


Anion Gap 10  


 


Blood Urea Nitrogen 11mg/dl  


 


Creatinine 0.76mg/dl  


 


Glucose Level 95mg/dl  


 


Calcium Level 8.6mg/dl  


 


Total Bilirubin 0.1mg/dl  


 


Direct Bilirubin 0.00mg/dl  


 


Indirect Bilirubin 0.1mg/dl  


 


Aspartate Amino Transf


(AST/SGOT) 20IU/L 


  


 


 


Alanine Aminotransferase


(ALT/SGPT) 28IU/L 


  


 


 


Alkaline Phosphatase 63IU/L  


 


Total Protein 6.6g/dl  


 


Albumin 3.6g/dl  


 


Globulin 3.00g/dl  


 


Albumin/Globulin Ratio 1.20  


 


Stool Occult Blood  NEGATIVE 








Result Diagram:  


9/12/17 0527 9/12/17 0527





Results 24 hrs





Laboratory Tests








Test


  9/11/17


23:30 9/12/17


05:27 9/12/17


05:29 9/12/17


05:30


 


Urine Pregnancy Test NEGATIVE     


 


White Blood Count  8.1  #  


 


Red Blood Count  4.33    


 


Hemoglobin  8.5  #L  


 


Hematocrit  29.1  L  


 


Mean Corpuscular Volume  67.2  L  


 


Mean Corpuscular Hemoglobin  19.6  L  


 


Mean Corpuscular Hemoglobin


Concent 


  29.2  L


  


  


 


 


Red Cell Distribution Width  24.2  #H  


 


Platelet Count  455  H  


 


Mean Platelet Volume  10.6  H  


 


Neutrophils %  61.0    


 


Lymphocytes %  27.0    


 


Monocytes %  10.6    


 


Eosinophils %  0.4    


 


Basophils %  0.6    


 


Nucleated Red Blood Cells %  0.0    


 


Neutrophils # (Manual)  4.9    


 


Lymphocytes #  2.2    


 


Monocytes #  0.9    


 


Eosinophils #  0.0    


 


Basophils #  0.1    


 


Nucleated Red Blood Cells #  0.0    


 


Sodium Level  139    


 


Potassium Level  4.2    


 


Chloride Level  108    


 


Carbon Dioxide Level  28    


 


Anion Gap  7  L  


 


Blood Urea Nitrogen  10    


 


Creatinine  0.71    


 


Glucose Level  94    


 


Hemoglobin A1c  5.2    


 


Calcium Level  8.3  L  


 


Phosphorus Level  3.9    


 


Magnesium Level  2.1    


 


Iron Level   14  L 


 


Total Iron Binding Capacity   390   


 


Percent Iron Saturation   4  L 


 


Lab Scanned Report


  


  


  


  BLOOD


TRANSFUSION














Test


  9/12/17


05:38 


  


  


 


 


Ferritin 4.3  L   

















ELOISE RODRIGUEZ MD Sep 12, 2017 23:14

## 2017-11-18 ENCOUNTER — HOSPITAL ENCOUNTER (EMERGENCY)
Dept: HOSPITAL 10 - FTE | Age: 32
Discharge: HOME | End: 2017-11-18
Payer: MEDICAID

## 2017-11-18 VITALS — DIASTOLIC BLOOD PRESSURE: 74 MMHG | RESPIRATION RATE: 19 BRPM | HEART RATE: 72 BPM | SYSTOLIC BLOOD PRESSURE: 125 MMHG

## 2017-11-18 VITALS
WEIGHT: 165.35 LBS | WEIGHT: 165.35 LBS | BODY MASS INDEX: 30.43 KG/M2 | HEIGHT: 62 IN | HEIGHT: 62 IN | BODY MASS INDEX: 30.43 KG/M2

## 2017-11-18 DIAGNOSIS — Z87.891: ICD-10-CM

## 2017-11-18 DIAGNOSIS — D64.9: Primary | ICD-10-CM

## 2017-11-18 DIAGNOSIS — R07.89: ICD-10-CM

## 2017-11-18 LAB
ABNORMAL IP MESSAGE: 1
ALBUMIN SERPL-MCNC: 3.9 G/DL (ref 3.3–4.9)
ALBUMIN/GLOB SERPL: 1.11 {RATIO}
ALP SERPL-CCNC: 62 IU/L (ref 42–121)
ALT SERPL-CCNC: 38 IU/L (ref 13–69)
ANION GAP SERPL CALC-SCNC: 12 MMOL/L (ref 8–16)
APTT BLD: 29.2 SEC (ref 25–35)
AST SERPL-CCNC: 20 IU/L (ref 15–46)
BASOPHILS # BLD AUTO: 0 10^3/UL (ref 0–0.1)
BASOPHILS NFR BLD: 0.3 % (ref 0–2)
BILIRUB DIRECT SERPL-MCNC: 0 MG/DL (ref 0–0.2)
BILIRUB SERPL-MCNC: 0.4 MG/DL (ref 0.2–1.3)
BUN SERPL-MCNC: 12 MG/DL (ref 7–20)
CALCIUM SERPL-MCNC: 9.3 MG/DL (ref 8.4–10.2)
CHLORIDE SERPL-SCNC: 108 MMOL/L (ref 97–110)
CO2 SERPL-SCNC: 27 MMOL/L (ref 21–31)
CREAT SERPL-MCNC: 0.65 MG/DL (ref 0.44–1)
EOSINOPHIL # BLD: 0 10^3/UL (ref 0–0.5)
EOSINOPHIL NFR BLD: 0.1 % (ref 0–7)
ERYTHROCYTE [DISTWIDTH] IN BLOOD BY AUTOMATED COUNT: 22.3 % (ref 11.5–14.5)
GLOBULIN SER-MCNC: 3.5 G/DL (ref 1.3–3.2)
GLUCOSE SERPL-MCNC: 97 MG/DL (ref 70–220)
HCT VFR BLD CALC: 32.5 % (ref 37–47)
HGB BLD-MCNC: 9.5 G/DL (ref 12–16)
INR PPP: 1
LYMPHOCYTES # BLD AUTO: 1.8 10^3/UL (ref 0.8–2.9)
LYMPHOCYTES NFR BLD AUTO: 24.4 % (ref 15–51)
MCH RBC QN AUTO: 19.5 PG (ref 29–33)
MCHC RBC AUTO-ENTMCNC: 29.2 G/DL (ref 32–37)
MCV RBC AUTO: 66.9 FL (ref 82–101)
MONOCYTES # BLD: 0.5 10^3/UL (ref 0.3–0.9)
MONOCYTES NFR BLD: 7.3 % (ref 0–11)
NEUTROPHILS # BLD: 4.9 10^3/UL (ref 1.6–7.5)
NEUTROPHILS NFR BLD AUTO: 67.6 % (ref 39–77)
NRBC # BLD MANUAL: 0 10^3/UL (ref 0–0)
NRBC BLD AUTO-RTO: 0 /100WBC (ref 0–0)
PLATELET # BLD: 387 10^3/UL (ref 140–415)
PMV BLD AUTO: 10.3 FL (ref 7.4–10.4)
POTASSIUM SERPL-SCNC: 4.3 MMOL/L (ref 3.5–5.1)
PROT SERPL-MCNC: 7.4 G/DL (ref 6.1–8.1)
PROTHROMBIN TIME: 13.2 SEC (ref 12.2–14.2)
PT RATIO: 1
RBC # BLD AUTO: 4.86 10^6/UL (ref 4.2–5.4)
SODIUM SERPL-SCNC: 143 MMOL/L (ref 135–144)
WBC # BLD AUTO: 7.2 10^3/UL (ref 4.8–10.8)

## 2017-11-18 PROCEDURE — 80053 COMPREHEN METABOLIC PANEL: CPT

## 2017-11-18 PROCEDURE — 85730 THROMBOPLASTIN TIME PARTIAL: CPT

## 2017-11-18 PROCEDURE — 36415 COLL VENOUS BLD VENIPUNCTURE: CPT

## 2017-11-18 PROCEDURE — 85610 PROTHROMBIN TIME: CPT

## 2017-11-18 PROCEDURE — 85025 COMPLETE CBC W/AUTO DIFF WBC: CPT

## 2017-11-18 PROCEDURE — 93005 ELECTROCARDIOGRAM TRACING: CPT

## 2017-11-18 PROCEDURE — 96374 THER/PROPH/DIAG INJ IV PUSH: CPT

## 2017-11-18 NOTE — ERD
ER Documentation


Chief Complaint


Chief Complaint


Complains of  lightheadedness with vomiting since this am





HPI


Patient is a 32-year-old female with a past medical history of anemia presented 

to the ED for concerns of lightheadedness as well as nausea and vomiting which 

started earlier today.  Patient is concerned that her hemoglobin levels low.  

Patient states the last time she had similar symptoms she was anemic and 

required a blood transfusion.  Patient reports one episode of nonbloody 

nonbilious vomiting.  Patient denies any falls.  Patient states she feels 

lightheaded when walking.  Patient reports left-sided chest pain, nonradiating.

  Denies any shortness of breath, left upper extremity pain, diaphoresis or 

loss of consciousness. Patient denies any history of DVT, PE, recent travel, 

OCP use.  Patient denies any abdominal pain, diarrhea, rectal bleeding or 

excessive vaginal bleeding.  Patient states her last menstrual period was 2 

months ago and she has irregular periods.  Patient is currently taking iron 

supplements.





ROS


All systems reviewed and are negative except as per history of present illness.





Medications


Home Meds


Active Scripts


Ferrous Sulfate* (Ferrous Sulfate*) 325 Mg Tabec, 325 MG PO BID, #60 TAB


   Prov:JEET MENDEZ PA-C         11/18/17


Ferrous Sulfate* (Ferrous Sulfate*) 325 Mg Tabec, 325 MG PO BID for 60 Days, TAB


   Prov:SHERRI STEWART NP         10/8/16


Reported Medications


Ascorbic Acid (Vitamin C) Unknown Strength Tab, 1 TAB PO TID, TAB


   9/11/17





Allergies


Allergies:  


Coded Allergies:  


     No Known Allergy (Unverified , 9/11/17)





PMhx/Soc


History of Surgery:  No


Anesthesia Reaction:  No


Hx Neurological Disorder:  No


Hx Respiratory Disorders:  No


Hx Cardiac Disorders:  No


Hx Psychiatric Problems:  No


Hx Miscellaneous Medical Probl:  No


Hx Alcohol Use:  Yes (in the past)


Hx Substance Use:  Yes (In the past)


Hx Tobacco Use:  Yes


Smoking Status:  Former smoker





Physical Exam


Vitals





Vital Signs








  Date Time  Temp Pulse Resp B/P Pulse Ox O2 Delivery O2 Flow Rate FiO2


 


11/18/17 13:12  72 19 125/74 100   


 


11/18/17 10:58 99.0 79 20 123/75 100   








Physical Exam


GENERAL: Well-developed, well-nourished female. Appears in no acute distress. 


HEAD: Normocephalic, atraumatic. 


EYES: Pupils are equally reactive bilaterally. EOMs grossly intact. No 

conjunctival erythema. 


ENT: Moist mucous membranes. No uvula deviation. No kissing tonsils. 


NECK: Supple. No meningismus. Normal range of motion of the neck.


LUNG: Clear to auscultation bilaterally. No rhonchi, wheezing, rales or coarse 

breath sounds. 


CHEST WALL: Left chest wall is tender to palpation.  Pain is reproducible.


HEART: Regular rate and rhythm. No murmurs, rubs or gallops.


ABDOMEN: No scars, ecchymosis or rashes noted. Soft, nontender, and 

nondistended. Positive bowel sounds in all four quadrants. No rebound tenderness

, no guarding. (-) McBurney's point tenderness. No CVA tenderness.


BACK: No midline tenderness. 


EXTREMITIES: Equal pulses bilaterally. No peripheral clubbing, cyanosis or 

edema. No unilateral leg swelling.


NEUROLOGIC: Alert and oriented x3, cooperative. Mood and affect appropriate to 

situation. Cranial nerves II through XII are grossly intact. Normal speech. 

Motor exam: 5/5 strength in upper and lower extremities. Sensory exam: 

Sensation intact to light touch on all four extremities. Cerebellar function 

exam: No dysmetria on finger-to-nose test. Steady gait. No pronator drift.


SKIN: Normal color. Warm and dry. No rashes or lesions.


Result Diagram:  


11/18/17 1131                                                                  

              11/18/17 1131





Results 24 hrs





 Laboratory Tests








Test


  11/18/17


11:31


 


White Blood Count 7.210^3/ul 


 


Red Blood Count 4.8610^6/ul 


 


Hemoglobin 9.5g/dl 


 


Hematocrit 32.5% 


 


Mean Corpuscular Volume 66.9fl 


 


Mean Corpuscular Hemoglobin 19.5pg 


 


Mean Corpuscular Hemoglobin


Concent 29.2g/dl 


 


 


Red Cell Distribution Width 22.3% 


 


Platelet Count 61194^3/UL 


 


Mean Platelet Volume 10.3fl 


 


Neutrophils % 67.6% 


 


Lymphocytes % 24.4% 


 


Monocytes % 7.3% 


 


Eosinophils % 0.1% 


 


Basophils % 0.3% 


 


Nucleated Red Blood Cells % 0.0/100WBC 


 


Neutrophils # 4.910^3/ul 


 


Lymphocytes # 1.810^3/ul 


 


Monocytes # 0.510^3/ul 


 


Eosinophils # 0.010^3/ul 


 


Basophils # 0.010^3/ul 


 


Nucleated Red Blood Cells # 0.010^3/ul 


 


Prothrombin Time 13.2Sec 


 


Prothrombin Time Ratio 1.0 


 


INR International Normalized


Ratio 1.00 


 


 


Activated Partial


Thromboplast Time 29.2Sec 


 


 


Sodium Level 143mmol/L 


 


Potassium Level 4.3mmol/L 


 


Chloride Level 108mmol/L 


 


Carbon Dioxide Level 27mmol/L 


 


Anion Gap 12 


 


Blood Urea Nitrogen 12mg/dl 


 


Creatinine 0.65mg/dl 


 


Glucose Level 97mg/dl 


 


Calcium Level 9.3mg/dl 


 


Total Bilirubin 0.4mg/dl 


 


Direct Bilirubin 0.00mg/dl 


 


Indirect Bilirubin 0.4mg/dl 


 


Aspartate Amino Transf


(AST/SGOT) 20IU/L 


 


 


Alanine Aminotransferase


(ALT/SGPT) 38IU/L 


 


 


Alkaline Phosphatase 62IU/L 


 


Total Protein 7.4g/dl 


 


Albumin 3.9g/dl 


 


Globulin 3.50g/dl 


 


Albumin/Globulin Ratio 1.11 








 Current Medications








 Medications


  (Trade)  Dose


 Ordered  Sig/Krystle


 Route


 PRN Reason  Start Time


 Stop Time Status Last Admin


Dose Admin


 


 Sodium Chloride


  (NS)  1,000 ml @ 


 1,000 mls/hr  Q1H STAT


 IV


   11/18/17 11:17


 11/18/17 12:16 DC 11/18/17 11:32


 


 


 Ondansetron HCl


  (Zofran Inj)  4 mg  ONCE  STAT


 IV


   11/18/17 11:17


 11/18/17 11:19 DC 11/18/17 11:31


 











Procedures/MDM


ED COURSE:


The patient was stable throughout ED course. I kept the patient and/or family 

informed of laboratory and diagnostic imaging results throughout the ED course.

  





EKG:


Read by Dr. Hutchinson, attending physician.


EKG shows normal sinus rhythm at a rate of 64 bpm.


No arrhythmias, acute ST elevations or T wave changes were noted.


 





MEDICATIONS GIVEN: 


IV fluids and Zofran


Patient tolerated medication well with no adverse reactions. Patient reported 

improvement in pain. 








MEDICAL DECISION MAKING:


This is a 32-year-old female with a past medical history of anemia presents to 

the ED for concerns of lightheadedness, nausea and vomiting.  Patient also 

reported left-sided chest pain 2 days.  Vital signs were reviewed. Patient was 

afebrile. Patient was not hypoxic. Full neuro exam was normal.  Blood work was 

obtained.  Patient's hemoglobin level is noted to be 9.5 and hematocrit was 

32.5.  Microcytic anemia was noted.  Patient's WBC was within normal limits. 

CMP showed no evidence of electrolyte abnormalities, severe acidosis, alkalosis

, renal failure, or liver disease. PT/PTT were within normal limits. Urine 

pregnancy test was negative.  EKG showed normal sinus rhythm.  Patient was 

given IV fluids and Zofran.  Patient reports significant improvement in 

symptoms.  At this time with patient presentation is most consistent with 

anemia and left chest wall pain.  Low suspicion for pneumonia, pneumothorax, PE

, ACS, pericarditis, CVA, cranial mass, intracranial hemorrhage.  Hemoglobin 

level was noted to be 9.5 however no indication for emergent, urgent blood 

transfusion at this time.  Patient was advised to continue iron supplements as 

prescribed to her.  Patient also advised to follow-up with her primary care 

physician for further management.  Patient reported improvement in symptoms for 

discharge.  Patient felt stable going home.








PRESCRIPTIONS: 


Iron supplements





DISCHARGE:


At this time, patient is stable for discharge and outpatient management.  

Patient is in a copy of all imaging studies and blood work obtained today.  I 

have instructed the patient to follow-up with his/her primary care physician in 

1-2 days. If symptoms persist, patient may need to see a specialist for further 

examinations and testing. I have instructed the patient to promptly return to 

the ER at any time for any new or worsening symptoms including increased 

increased pain, fever, nausea, vomiting, numbness, weakness, slurred speech, 

LOC. The patient and/or family expressed understanding of and agreement with 

this plan. All questions were answered. Home care instructions were provided. 





Disclaimer: Inadvertent spelling and grammatical errors are likely due to EHR/

dictation software use and do not reflect on the overall quality of patient 

care. Also, please note that the electronic time recorded on this note does not 

necessarily reflect the actual time of the patient encounter.





Departure


Diagnosis:  


 Primary Impression:  


 Anemia


 Anemia type:  unspecified type  Qualified Code:  D64.9 - Anemia, unspecified 

type


 Additional Impression:  


 Chest wall pain


Condition:  Stable


Patient Instructions:  Anemia, Iron Deficiency (Adult)


Referrals:  


COMMUNITY CLINICS


YOU HAVE RECEIVED A MEDICAL SCREENING EXAM AND THE RESULTS INDICATE THAT YOU DO 

NOT HAVE A CONDITION THAT REQUIRES URGENT TREATMENT IN THE EMERGENCY DEPARTMENT.





FURTHER EVALUATION AND TREATMENT OF YOUR CONDITION CAN WAIT UNTIL YOU ARE SEEN 

IN YOUR DOCTORS OFFICE WITHIN THE NEXT 1-2 DAYS. IT IS YOUR RESPONSIBILITY TO 

MAKE AN APPOINTMENT FOR FOLOW-UP CARE.





IF YOU HAVE A PRIMARY DOCTOR


--you should call your primary doctor and schedule an appointment





IF YOU DO NOT HAVE A PRIMARY DOCTOR YOU CAN CALL OUR PHYSICIAN REFERRAL HOTLINE 

AT


 [phone] 





IF YOU CAN NOT AFFORD TO SEE A PHYSICIAN YOU CAN CHOSE FROM THE FOLLOWING 

[facility]





[facility] [phone] [address] [facility] [phone] [address] [facility] [phone] [address] [facility] [phone] [address] [facility] [phone] [address] [facility]. [facility] [phone] [address] [facility]. [facility]


YOU HAVE RECEIVED A MEDICAL SCREENING EXAM AND THE RESULTS INDICATE THAT YOU DO 

NOT HAVE A CONDITION THAT REQUIRES URGENT TREATMENT IN THE EMERGENCY DEPARTMENT.





FURTHER EVALUATION AND TREATMENT OF YOUR CONDITION CAN WAIT UNTIL YOU ARE SEEN 

IN YOUR DOCTORS OFFICE WITHIN THE NEXT 1-2 DAYS. IT IS YOUR RESPONSIBILITY TO 

MAKE AN APPOINTMENT FOR FOLOW-UP CARE.





IF YOU HAVE A PRIMARY DOCTOR


--you should call your primary doctor and schedule and appointment





IF YOU DO NOT HAVE A PRIMARY DOCTOR YOU CAN CALL OUR PHYSICIAN REFERRAL HOTLINE 

AT [phone].





IF YOU CAN NOT AFFORD TO SEE A PHYSICIAN YOU CAN CHOSE FROM THE FOLLOWING 

[facility] INSTITUTIONS:





[facility]


[address] [place] [address]





[facility]


[address] [place] [address]





[facility]


[address] [place] [address]





OB/GYN REFERRAL LIST


JACKIE NUÑEZ MD


[address] [facility] 


[address] [place] [address] [phone] OFFICE FAX [phone]





LUCIANO VILLAGRAN


[address] [place] [address] [phone]





[facility]


[address] [place], [place] [id number] [phone]





DR PARSONS [facility]


[address] [facility], [address] [address]


[phone]





DR JAFFE, ERASTORO


[address] [place] [id number] [phone]





[facility]


[address] [place] [address]


[phone] [address] [facility] [address]


[phone]





DR CARREON FELICITAS


[address], [facility] [address]


[phone]





DR ABRAHAM, KESHA


[address] [facility]. [address], [facility] [address]


[phone]





DR MARTINEZ, [facility]


[address] [place], [place] [id number] [phone]





Additional Instructions:  


Continue to take iron supplements.  Follow-up with your OB/GYN.





Call your primary care doctor TOMORROW for an appointment during the next 1-2 

days.See the doctor sooner or return here if your condition worsens before your 

appointment time.











JEET MENDEZ PA-C Nov 18, 2017 16:17

## 2018-05-03 ENCOUNTER — HOSPITAL ENCOUNTER (EMERGENCY)
Dept: HOSPITAL 91 - FTE | Age: 33
Discharge: HOME | End: 2018-05-03
Payer: MEDICAID

## 2018-05-03 ENCOUNTER — HOSPITAL ENCOUNTER (EMERGENCY)
Age: 33
Discharge: HOME | End: 2018-05-03

## 2018-05-03 DIAGNOSIS — Z87.891: ICD-10-CM

## 2018-05-03 DIAGNOSIS — D64.9: Primary | ICD-10-CM

## 2018-05-03 LAB
ABNORMAL IP MESSAGE: 1
ADD MAN DIFF?: NO
ADD UMIC: YES
BASOPHIL #: 0 10^3/UL (ref 0–0.1)
BASOPHILS %: 0.4 % (ref 0–2)
EOSINOPHILS #: 0 10^3/UL (ref 0–0.5)
EOSINOPHILS %: 0.2 % (ref 0–7)
HEMATOCRIT: 27.1 % (ref 37–47)
HEMOGLOBIN: 7.6 G/DL (ref 12–16)
IMMEDIATE SPIN CROSSMATCH: 1 1
LYMPHOCYTES #: 1.9 10^3/UL (ref 0.8–2.9)
LYMPHOCYTES %: 21.3 % (ref 15–51)
MEAN CORPUSCULAR HEMOGLOBIN: 17.4 PG (ref 29–33)
MEAN CORPUSCULAR HGB CONC: 28 G/DL (ref 32–37)
MEAN CORPUSCULAR VOLUME: 62 FL (ref 82–101)
MEAN PLATELET VOLUME: 9.6 FL (ref 7.4–10.4)
MONOCYTE #: 0.7 10^3/UL (ref 0.3–0.9)
MONOCYTES %: 7.2 % (ref 0–11)
NEUTROPHIL #: 6.4 10^3/UL (ref 1.6–7.5)
NEUTROPHILS %: 70.3 % (ref 39–77)
NUCLEATED RED BLOOD CELLS #: 0 10^3/UL (ref 0–0)
NUCLEATED RED BLOOD CELLS%: 0 /100WBC (ref 0–0)
PLATELET COUNT: 485 10^3/UL (ref 140–415)
POSITIVE DIFF: (no result)
RED BLOOD COUNT: 4.37 10^6/UL (ref 4.2–5.4)
RED CELL DISTRIBUTION WIDTH: 17.2 % (ref 11.5–14.5)
UR ASCORBIC ACID: NEGATIVE MG/DL
UR BILIRUBIN (DIP): NEGATIVE MG/DL
UR BLOOD (DIP): (no result) MG/DL
UR CLARITY: CLEAR
UR COLOR: YELLOW
UR GLUCOSE (DIP): NEGATIVE MG/DL
UR KETONES (DIP): NEGATIVE MG/DL
UR LEUKOCYTE ESTERASE (DIP): NEGATIVE LEU/UL
UR NITRITE (DIP): NEGATIVE MG/DL
UR PH (DIP): 7 (ref 5–9)
UR RBC: 1 /HPF (ref 0–5)
UR SPECIFIC GRAVITY (DIP): 1.01 (ref 1–1.03)
UR TOTAL PROTEIN (DIP): NEGATIVE MG/DL
UR UROBILINOGEN (DIP): NEGATIVE MG/DL
UR WBC: 1 /HPF (ref 0–5)
WHITE BLOOD COUNT: 9.1 10^3/UL (ref 4.8–10.8)

## 2018-05-03 PROCEDURE — 81001 URINALYSIS AUTO W/SCOPE: CPT

## 2018-05-03 PROCEDURE — 86900 BLOOD TYPING SEROLOGIC ABO: CPT

## 2018-05-03 PROCEDURE — 36430 TRANSFUSION BLD/BLD COMPNT: CPT

## 2018-05-03 PROCEDURE — 86920 COMPATIBILITY TEST SPIN: CPT

## 2018-05-03 PROCEDURE — 81025 URINE PREGNANCY TEST: CPT

## 2018-05-03 PROCEDURE — 86901 BLOOD TYPING SEROLOGIC RH(D): CPT

## 2018-05-03 PROCEDURE — 99291 CRITICAL CARE FIRST HOUR: CPT

## 2018-05-03 PROCEDURE — 85025 COMPLETE CBC W/AUTO DIFF WBC: CPT

## 2018-05-03 PROCEDURE — 86850 RBC ANTIBODY SCREEN: CPT

## 2018-05-03 RX ADMIN — LIDOCAINE HYDROCHLORIDE 1 MLS/HR: 10 INJECTION, SOLUTION EPIDURAL; INFILTRATION; INTRACAUDAL; PERINEURAL at 14:30

## 2018-05-03 RX ADMIN — METOCLOPRAMIDE HYDROCHLORIDE 1 MG: 10 TABLET ORAL at 13:07

## 2018-07-02 ENCOUNTER — HOSPITAL ENCOUNTER (EMERGENCY)
Age: 33
Discharge: HOME | End: 2018-07-02

## 2018-07-02 ENCOUNTER — HOSPITAL ENCOUNTER (EMERGENCY)
Dept: HOSPITAL 91 - FTE | Age: 33
Discharge: HOME | End: 2018-07-02
Payer: MEDICAID

## 2018-07-02 DIAGNOSIS — K12.1: Primary | ICD-10-CM

## 2018-07-02 DIAGNOSIS — K12.30: ICD-10-CM

## 2018-07-02 PROCEDURE — 99283 EMERGENCY DEPT VISIT LOW MDM: CPT

## 2018-07-30 ENCOUNTER — HOSPITAL ENCOUNTER (EMERGENCY)
Dept: HOSPITAL 91 - FTE | Age: 33
Discharge: HOME | End: 2018-07-30
Payer: MEDICAID

## 2018-07-30 ENCOUNTER — HOSPITAL ENCOUNTER (EMERGENCY)
Age: 33
Discharge: HOME | End: 2018-07-30

## 2018-07-30 DIAGNOSIS — R11.10: Primary | ICD-10-CM

## 2018-07-30 DIAGNOSIS — R19.7: ICD-10-CM

## 2018-07-30 LAB
ABNORMAL IP MESSAGE: 1
ADD MAN DIFF?: NO
ADD UMIC: YES
ALANINE AMINOTRANSFERASE: 21 IU/L (ref 13–69)
ALBUMIN/GLOBULIN RATIO: 1.36
ALBUMIN: 4.1 G/DL (ref 3.3–4.9)
ALKALINE PHOSPHATASE: 64 IU/L (ref 42–121)
ANION GAP: 10 (ref 8–16)
ASPARTATE AMINO TRANSFERASE: 17 IU/L (ref 15–46)
BASOPHIL #: 0 10^3/UL (ref 0–0.1)
BASOPHILS %: 0.3 % (ref 0–2)
BILIRUBIN,DIRECT: 0 MG/DL (ref 0–0.2)
BILIRUBIN,TOTAL: 0.3 MG/DL (ref 0.2–1.3)
BLOOD UREA NITROGEN: 11 MG/DL (ref 7–20)
CALCIUM: 8.9 MG/DL (ref 8.4–10.2)
CARBON DIOXIDE: 27 MMOL/L (ref 21–31)
CHLORIDE: 108 MMOL/L (ref 97–110)
CREATININE: 0.65 MG/DL (ref 0.44–1)
EOSINOPHILS #: 0 10^3/UL (ref 0–0.5)
EOSINOPHILS %: 0.4 % (ref 0–7)
GLOBULIN: 3 G/DL (ref 1.3–3.2)
GLUCOSE: 96 MG/DL (ref 70–220)
HEMATOCRIT: 27.8 % (ref 37–47)
HEMOGLOBIN: 7.7 G/DL (ref 12–16)
LIPASE: 117 U/L (ref 23–300)
LYMPHOCYTES #: 2.1 10^3/UL (ref 0.8–2.9)
LYMPHOCYTES %: 20.2 % (ref 15–51)
MEAN CORPUSCULAR HEMOGLOBIN: 17.1 PG (ref 29–33)
MEAN CORPUSCULAR HGB CONC: 27.7 G/DL (ref 32–37)
MEAN CORPUSCULAR VOLUME: 61.8 FL (ref 82–101)
MEAN PLATELET VOLUME: 10.4 FL (ref 7.4–10.4)
MONOCYTE #: 0.7 10^3/UL (ref 0.3–0.9)
MONOCYTES %: 7.1 % (ref 0–11)
NEUTROPHIL #: 7.3 10^3/UL (ref 1.6–7.5)
NEUTROPHILS %: 71.6 % (ref 39–77)
NUCLEATED RED BLOOD CELLS #: 0 10^3/UL (ref 0–0)
NUCLEATED RED BLOOD CELLS%: 0 /100WBC (ref 0–0)
PLATELET COUNT: 493 10^3/UL (ref 140–415)
POSITIVE DIFF: (no result)
POTASSIUM: 4.1 MMOL/L (ref 3.5–5.1)
RED BLOOD COUNT: 4.5 10^6/UL (ref 4.2–5.4)
RED CELL DISTRIBUTION WIDTH: 21 % (ref 11.5–14.5)
SODIUM: 141 MMOL/L (ref 135–144)
TOTAL PROTEIN: 7.1 G/DL (ref 6.1–8.1)
UR ASCORBIC ACID: NEGATIVE MG/DL
UR BILIRUBIN (DIP): NEGATIVE MG/DL
UR BLOOD (DIP): NEGATIVE MG/DL
UR CLARITY: (no result)
UR COLOR: YELLOW
UR GLUCOSE (DIP): NEGATIVE MG/DL
UR KETONES (DIP): NEGATIVE MG/DL
UR LEUKOCYTE ESTERASE (DIP): (no result) LEU/UL
UR MUCUS: (no result) /HPF
UR NITRITE (DIP): NEGATIVE MG/DL
UR PH (DIP): 5 (ref 5–9)
UR RBC: 6 /HPF (ref 0–5)
UR SPECIFIC GRAVITY (DIP): 1.03 (ref 1–1.03)
UR SQUAMOUS EPITHELIAL CELL: (no result) /HPF
UR TOTAL PROTEIN (DIP): NEGATIVE MG/DL
UR UROBILINOGEN (DIP): NEGATIVE MG/DL
UR WBC: 3 /HPF (ref 0–5)
WHITE BLOOD COUNT: 10.2 10^3/UL (ref 4.8–10.8)

## 2018-07-30 PROCEDURE — 36415 COLL VENOUS BLD VENIPUNCTURE: CPT

## 2018-07-30 PROCEDURE — 80053 COMPREHEN METABOLIC PANEL: CPT

## 2018-07-30 PROCEDURE — 81025 URINE PREGNANCY TEST: CPT

## 2018-07-30 PROCEDURE — 96374 THER/PROPH/DIAG INJ IV PUSH: CPT

## 2018-07-30 PROCEDURE — 99285 EMERGENCY DEPT VISIT HI MDM: CPT

## 2018-07-30 PROCEDURE — 81001 URINALYSIS AUTO W/SCOPE: CPT

## 2018-07-30 PROCEDURE — 85025 COMPLETE CBC W/AUTO DIFF WBC: CPT

## 2018-07-30 PROCEDURE — 74176 CT ABD & PELVIS W/O CONTRAST: CPT

## 2018-07-30 PROCEDURE — 83690 ASSAY OF LIPASE: CPT

## 2018-07-30 RX ADMIN — ONDANSETRON HYDROCHLORIDE 1 MG: 2 INJECTION, SOLUTION INTRAMUSCULAR; INTRAVENOUS at 15:55

## 2018-07-30 RX ADMIN — THIAMINE HYDROCHLORIDE 1 MLS/HR: 100 INJECTION, SOLUTION INTRAMUSCULAR; INTRAVENOUS at 15:55

## 2018-08-16 ENCOUNTER — HOSPITAL ENCOUNTER (EMERGENCY)
Dept: HOSPITAL 91 - FTE | Age: 33
Discharge: HOME | End: 2018-08-16
Payer: MEDICAID

## 2018-08-16 ENCOUNTER — HOSPITAL ENCOUNTER (EMERGENCY)
Age: 33
Discharge: HOME | End: 2018-08-16

## 2018-08-16 DIAGNOSIS — D64.9: Primary | ICD-10-CM

## 2018-08-16 LAB
ABNORMAL IP MESSAGE: 1
ADD MAN DIFF?: NO
ANION GAP: 12 (ref 8–16)
BASOPHIL #: 0.1 10^3/UL (ref 0–0.1)
BASOPHILS %: 0.5 % (ref 0–2)
BLOOD UREA NITROGEN: 13 MG/DL (ref 7–20)
CALCIUM: 9 MG/DL (ref 8.4–10.2)
CARBON DIOXIDE: 27 MMOL/L (ref 21–31)
CHLORIDE: 105 MMOL/L (ref 97–110)
CREATININE: 0.71 MG/DL (ref 0.44–1)
EOSINOPHILS #: 0 10^3/UL (ref 0–0.5)
EOSINOPHILS %: 0.2 % (ref 0–7)
GLUCOSE: 86 MG/DL (ref 70–220)
HEMATOCRIT: 26.9 % (ref 37–47)
HEMOGLOBIN: 7.5 G/DL (ref 12–16)
IMMATURE GRANS #M: 0.04 10^3/UL (ref 0–0.03)
IMMATURE GRANS % (M): 0.4 % (ref 0–0.43)
LYMPHOCYTES #: 2.8 10^3/UL (ref 0.8–2.9)
LYMPHOCYTES %: 26.4 % (ref 15–51)
MEAN CORPUSCULAR HEMOGLOBIN: 17 PG (ref 29–33)
MEAN CORPUSCULAR HGB CONC: 27.9 G/DL (ref 32–37)
MEAN CORPUSCULAR VOLUME: 60.9 FL (ref 82–101)
MEAN PLATELET VOLUME: 10 FL (ref 7.4–10.4)
MONOCYTE #: 1 10^3/UL (ref 0.3–0.9)
MONOCYTES %: 8.9 % (ref 0–11)
NEUTROPHIL #: 6.8 10^3/UL (ref 1.6–7.5)
NEUTROPHILS %: 63.6 % (ref 39–77)
NUCLEATED RED BLOOD CELLS #: 0 10^3/UL (ref 0–0)
NUCLEATED RED BLOOD CELLS%: 0 /100WBC (ref 0–0)
PLATELET COUNT: 497 10^3/UL (ref 140–415)
POSITIVE DIFF: (no result)
POTASSIUM: 3.7 MMOL/L (ref 3.5–5.1)
RED BLOOD COUNT: 4.42 10^6/UL (ref 4.2–5.4)
RED CELL DISTRIBUTION WIDTH: 19.1 % (ref 11.5–14.5)
SODIUM: 140 MMOL/L (ref 135–144)
URINE PH (DIP) POC: 7 (ref 5–8.5)
WHITE BLOOD COUNT: 10.7 10^3/UL (ref 4.8–10.8)

## 2018-08-16 PROCEDURE — 85025 COMPLETE CBC W/AUTO DIFF WBC: CPT

## 2018-08-16 PROCEDURE — 99284 EMERGENCY DEPT VISIT MOD MDM: CPT

## 2018-08-16 PROCEDURE — 81003 URINALYSIS AUTO W/O SCOPE: CPT

## 2018-08-16 PROCEDURE — 80048 BASIC METABOLIC PNL TOTAL CA: CPT

## 2018-08-16 PROCEDURE — 81025 URINE PREGNANCY TEST: CPT

## 2018-08-16 RX ADMIN — LIDOCAINE HYDROCHLORIDE 1 MLS/HR: 10 INJECTION, SOLUTION EPIDURAL; INFILTRATION; INTRACAUDAL; PERINEURAL at 20:03

## 2018-09-07 ENCOUNTER — HOSPITAL ENCOUNTER (INPATIENT)
Age: 33
LOS: 4 days | Discharge: HOME | DRG: 812 | End: 2018-09-11

## 2018-09-07 ENCOUNTER — HOSPITAL ENCOUNTER (INPATIENT)
Dept: HOSPITAL 91 - MS1 | Age: 33
LOS: 4 days | Discharge: HOME | DRG: 812 | End: 2018-09-11
Payer: MEDICAID

## 2018-09-07 DIAGNOSIS — D50.9: Primary | ICD-10-CM

## 2018-09-07 DIAGNOSIS — K59.09: ICD-10-CM

## 2018-09-07 DIAGNOSIS — E66.9: ICD-10-CM

## 2018-09-07 LAB
% IRON SATURATION: 6 % SAT (ref 22–52)
ABNORMAL IP MESSAGE: 1
ADD MAN DIFF?: YES
ALANINE AMINOTRANSFERASE: 30 IU/L (ref 13–69)
ALBUMIN/GLOBULIN RATIO: 1.2
ALBUMIN: 3.6 G/DL (ref 3.3–4.9)
ALKALINE PHOSPHATASE: 60 IU/L (ref 42–121)
ANION GAP: 10 (ref 8–16)
ANISOCYTOSIS: (no result) (ref 0–0)
ASPARTATE AMINO TRANSFERASE: 23 IU/L (ref 15–46)
BASOPHIL #M: 0.2 10^3/UL (ref 0–0)
BASOPHILS % (M): 2 % (ref 0–2)
BILIRUBIN,DIRECT: 0 MG/DL (ref 0–0.2)
BILIRUBIN,TOTAL: 0.2 MG/DL (ref 0.2–1.3)
BLOOD UREA NITROGEN: 14 MG/DL (ref 7–20)
CALCIUM: 8.8 MG/DL (ref 8.4–10.2)
CARBON DIOXIDE: 26 MMOL/L (ref 21–31)
CHLORIDE: 106 MMOL/L (ref 97–110)
CREATININE: 0.66 MG/DL (ref 0.44–1)
FERRITIN: 3.3 NG/ML (ref 6.2–137)
GIANT THROMBO% (M): 3 % (ref 0–0)
GLOBULIN: 3 G/DL (ref 1.3–3.2)
GLUCOSE: 102 MG/DL (ref 70–220)
HEMATOCRIT: 23.2 % (ref 37–47)
HEMOGLOBIN: 6.4 G/DL (ref 12–16)
IMMATURE GRANS #M: 0.05 10^3/UL (ref 0–0.03)
IMMATURE GRANS % (M): 0.5 % (ref 0–0.43)
IMMEDIATE SPIN CROSSMATCH: 1 2
IRON: 23 UG/DL (ref 35–150)
LYMPHOCYTES #M: 2.9 10^3/UL (ref 0.8–2.9)
LYMPHOCYTES % (M): 27 % (ref 15–51)
MEAN CORPUSCULAR HEMOGLOBIN: 16.5 PG (ref 29–33)
MEAN CORPUSCULAR HGB CONC: 27.6 G/DL (ref 32–37)
MEAN CORPUSCULAR VOLUME: 59.9 FL (ref 82–101)
MEAN PLATELET VOLUME: 9.8 FL (ref 7.4–10.4)
METAMYELOCYTES #M: 0.1 10^3/UL (ref 0–0)
METAMYELOCYTES %M: 1 % (ref 0–0)
MICROCYTOSIS: (no result) (ref 0–0)
MONOCYTE #M: 0.7 10^3/UL (ref 0.3–0.9)
MONOCYTES % (M): 7 % (ref 0–11)
NUCLEATED RED BLOOD CELLS%: 0 /100WBC (ref 0–0)
OVALOCYTES: (no result) (ref 0–0)
PATH REVIEW?: YES
PLATELET COUNT: 516 10^3/UL (ref 140–415)
PLATELET ESTIMATE: NORMAL
POIKILOCYTOSIS: (no result) (ref 0–0)
POLYCHROMASIA: (no result) (ref 0–0)
POSITIVE DIFF: (no result)
POTASSIUM: 3.4 MMOL/L (ref 3.5–5.1)
RED BLOOD COUNT: 3.87 10^6/UL (ref 4.2–5.4)
RED CELL DISTRIBUTION WIDTH: 18.1 % (ref 11.5–14.5)
SEGMENTED NEUTROPHILS (M) %: 63 % (ref 39–77)
SMUDGE%M: 17 % (ref 0–0)
SODIUM: 139 MMOL/L (ref 135–144)
TOTAL IRON BINDING CAPACITY: 411 UG/DL (ref 241–421)
TOTAL PROTEIN: 6.6 G/DL (ref 6.1–8.1)
URINE PH (DIP) POC: 6 (ref 5–8.5)
WHITE BLOOD COUNT: 10.8 10^3/UL (ref 4.8–10.8)

## 2018-09-07 PROCEDURE — 82746 ASSAY OF FOLIC ACID SERUM: CPT

## 2018-09-07 PROCEDURE — 88312 SPECIAL STAINS GROUP 1: CPT

## 2018-09-07 PROCEDURE — 81003 URINALYSIS AUTO W/O SCOPE: CPT

## 2018-09-07 PROCEDURE — 80053 COMPREHEN METABOLIC PANEL: CPT

## 2018-09-07 PROCEDURE — 83010 ASSAY OF HAPTOGLOBIN QUANT: CPT

## 2018-09-07 PROCEDURE — 82270 OCCULT BLOOD FECES: CPT

## 2018-09-07 PROCEDURE — 85025 COMPLETE CBC W/AUTO DIFF WBC: CPT

## 2018-09-07 PROCEDURE — 83735 ASSAY OF MAGNESIUM: CPT

## 2018-09-07 PROCEDURE — 99285 EMERGENCY DEPT VISIT HI MDM: CPT

## 2018-09-07 PROCEDURE — 86703 HIV-1/HIV-2 1 RESULT ANTBDY: CPT

## 2018-09-07 PROCEDURE — 76830 TRANSVAGINAL US NON-OB: CPT

## 2018-09-07 PROCEDURE — 86850 RBC ANTIBODY SCREEN: CPT

## 2018-09-07 PROCEDURE — 86900 BLOOD TYPING SEROLOGIC ABO: CPT

## 2018-09-07 PROCEDURE — 84155 ASSAY OF PROTEIN SERUM: CPT

## 2018-09-07 PROCEDURE — 81025 URINE PREGNANCY TEST: CPT

## 2018-09-07 PROCEDURE — 84165 PROTEIN E-PHORESIS SERUM: CPT

## 2018-09-07 PROCEDURE — 86901 BLOOD TYPING SEROLOGIC RH(D): CPT

## 2018-09-07 PROCEDURE — 84443 ASSAY THYROID STIM HORMONE: CPT

## 2018-09-07 PROCEDURE — 80048 BASIC METABOLIC PNL TOTAL CA: CPT

## 2018-09-07 PROCEDURE — 83020 HEMOGLOBIN ELECTROPHORESIS: CPT

## 2018-09-07 PROCEDURE — 83540 ASSAY OF IRON: CPT

## 2018-09-07 PROCEDURE — 76856 US EXAM PELVIC COMPLETE: CPT

## 2018-09-07 PROCEDURE — 82607 VITAMIN B-12: CPT

## 2018-09-07 PROCEDURE — 82728 ASSAY OF FERRITIN: CPT

## 2018-09-07 PROCEDURE — 36430 TRANSFUSION BLD/BLD COMPNT: CPT

## 2018-09-07 PROCEDURE — 85045 AUTOMATED RETICULOCYTE COUNT: CPT

## 2018-09-07 PROCEDURE — 96360 HYDRATION IV INFUSION INIT: CPT

## 2018-09-07 PROCEDURE — 84100 ASSAY OF PHOSPHORUS: CPT

## 2018-09-07 PROCEDURE — 88305 TISSUE EXAM BY PATHOLOGIST: CPT

## 2018-09-07 PROCEDURE — 83036 HEMOGLOBIN GLYCOSYLATED A1C: CPT

## 2018-09-07 PROCEDURE — 86920 COMPATIBILITY TEST SPIN: CPT

## 2018-09-07 PROCEDURE — 80061 LIPID PANEL: CPT

## 2018-09-07 PROCEDURE — 83615 LACTATE (LD) (LDH) ENZYME: CPT

## 2018-09-07 RX ADMIN — THIAMINE HYDROCHLORIDE 1 MLS/HR: 100 INJECTION, SOLUTION INTRAMUSCULAR; INTRAVENOUS at 19:33

## 2018-09-07 RX ADMIN — LIDOCAINE HYDROCHLORIDE 1 MLS/HR: 10 INJECTION, SOLUTION EPIDURAL; INFILTRATION; INTRACAUDAL; PERINEURAL at 21:45

## 2018-09-08 LAB
ABNORMAL IP MESSAGE: 1
ADD MAN DIFF?: NO
ALANINE AMINOTRANSFERASE: 30 IU/L (ref 13–69)
ALBUMIN/GLOBULIN RATIO: 1.1
ALBUMIN: 3.3 G/DL (ref 3.3–4.9)
ALKALINE PHOSPHATASE: 49 IU/L (ref 42–121)
ANION GAP: 12 (ref 8–16)
ASPARTATE AMINO TRANSFERASE: 19 IU/L (ref 15–46)
BASOPHIL #: 0 10^3/UL (ref 0–0.1)
BASOPHILS %: 0.5 % (ref 0–2)
BILIRUBIN,DIRECT: 0 MG/DL (ref 0–0.2)
BILIRUBIN,TOTAL: 0.6 MG/DL (ref 0.2–1.3)
BLOOD UREA NITROGEN: 9 MG/DL (ref 7–20)
CALCIUM: 8.5 MG/DL (ref 8.4–10.2)
CARBON DIOXIDE: 24 MMOL/L (ref 21–31)
CHLORIDE: 111 MMOL/L (ref 97–110)
CHOL/HDL RATIO: 3.3 RATIO
CHOLESTEROL: 119 MG/DL (ref 100–200)
CREATININE: 0.6 MG/DL (ref 0.44–1)
EOSINOPHILS #: 0 10^3/UL (ref 0–0.5)
EOSINOPHILS %: 0.4 % (ref 0–7)
FOLATE: 10.9 NG/ML (ref 2.8–20)
GLOBULIN: 3 G/DL (ref 1.3–3.2)
GLUCOSE: 84 MG/DL (ref 70–220)
HDL CHOLESTEROL: 36 MG/DL (ref 34–82)
HEMATOCRIT: 28.6 % (ref 37–47)
HEMOGLOBIN A1C: 5.3 % (ref 0–5.9)
HEMOGLOBIN: 8.4 G/DL (ref 12–16)
HIV 1&2 ANTIBODY: NEGATIVE
IMMATURE GRANS #M: 0.02 10^3/UL (ref 0–0.03)
IMMATURE GRANS % (M): 0.2 % (ref 0–0.43)
LACTATE DEHYDROGENASE: 355 IU/L (ref 313–618)
LACTATE DEHYDROGENASE: 506 IU/L (ref 313–618)
LDL CHOLESTEROL,CALCULATED: 69 MG/DL
LYMPHOCYTES #: 2.3 10^3/UL (ref 0.8–2.9)
LYMPHOCYTES %: 28.3 % (ref 15–51)
MAGNESIUM: 2.1 MG/DL (ref 1.7–2.5)
MEAN CORPUSCULAR HEMOGLOBIN: 18.8 PG (ref 29–33)
MEAN CORPUSCULAR HGB CONC: 29.4 G/DL (ref 32–37)
MEAN CORPUSCULAR VOLUME: 64 FL (ref 82–101)
MEAN PLATELET VOLUME: 9.9 FL (ref 7.4–10.4)
MONOCYTE #: 0.6 10^3/UL (ref 0.3–0.9)
MONOCYTES %: 7.6 % (ref 0–11)
NEUTROPHIL #: 5.1 10^3/UL (ref 1.6–7.5)
NEUTROPHILS %: 63 % (ref 39–77)
NUCLEATED RED BLOOD CELLS #: 0 10^3/UL (ref 0–0)
NUCLEATED RED BLOOD CELLS%: 0 /100WBC (ref 0–0)
OCCULT BLOOD STOOL: POSITIVE
PLATELET COUNT: 474 10^3/UL (ref 140–415)
POSITIVE DIFF: (no result)
POTASSIUM: 3.8 MMOL/L (ref 3.5–5.1)
RED BLOOD COUNT: 4.47 10^6/UL (ref 4.2–5.4)
RED CELL DISTRIBUTION WIDTH: 23.5 % (ref 11.5–14.5)
RETICULOCYTE COUNT #: 0.03 X10^6 (ref 0.02–0.11)
RETICULOCYTE COUNT #: 0.04 X10^6 (ref 0.02–0.11)
RETICULOCYTE COUNT %: 0.7 % (ref 0.5–1.5)
RETICULOCYTE COUNT %: 0.7 % (ref 0.5–1.5)
RETICULOCYTE RBC: 4.52
RETICULOCYTE RBC: 4.9
SODIUM: 143 MMOL/L (ref 135–144)
THYROID STIMULATING HORMONE: 1.79 MIU/L (ref 0.47–4.68)
THYROID STIMULATING HORMONE: 5.19 MIU/L (ref 0.47–4.68)
TOTAL PROTEIN: 6.3 G/DL (ref 6.1–8.1)
TRIGLYCERIDES: 69 MG/DL (ref 0–149)
VITAMIN B12: 396 PG/ML (ref 239–931)
WHITE BLOOD COUNT: 8.1 10^3/UL (ref 4.8–10.8)

## 2018-09-08 RX ADMIN — SODIUM FERRIC GLUCONATE COMPLEX 1 MLS/HR: 12.5 INJECTION INTRAVENOUS at 16:33

## 2018-09-08 RX ADMIN — BISACODYL 1 MG: 5 TABLET, COATED ORAL at 20:36

## 2018-09-08 RX ADMIN — ALUMINUM HYDROXIDE, MAGNESIUM HYDROXIDE, DIMETHICONE 1 ML: 200; 200; 20 SUSPENSION ORAL at 20:35

## 2018-09-08 RX ADMIN — ACETAMINOPHEN 1 MG: 325 TABLET, FILM COATED ORAL at 08:51

## 2018-09-08 RX ADMIN — FUROSEMIDE 1 MG: 10 INJECTION, SOLUTION INTRAVENOUS at 05:20

## 2018-09-08 RX ADMIN — SUCRALFATE 1 GM: 1 SUSPENSION ORAL at 20:35

## 2018-09-08 RX ADMIN — PANTOPRAZOLE SODIUM 1 MG: 40 INJECTION, POWDER, FOR SOLUTION INTRAVENOUS at 18:06

## 2018-09-08 RX ADMIN — POLYETHYLENE GLYCOL 3350 1 GM: 17 POWDER, FOR SOLUTION ORAL at 20:35

## 2018-09-09 LAB
ABNORMAL IP MESSAGE: 1
ADD MAN DIFF?: NO
ALANINE AMINOTRANSFERASE: 25 IU/L (ref 13–69)
ALBUMIN/GLOBULIN RATIO: 1.25
ALBUMIN: 3.5 G/DL (ref 3.3–4.9)
ALKALINE PHOSPHATASE: 52 IU/L (ref 42–121)
ANION GAP: 9 (ref 8–16)
ASPARTATE AMINO TRANSFERASE: 18 IU/L (ref 15–46)
BASOPHIL #: 0 10^3/UL (ref 0–0.1)
BASOPHILS %: 0.4 % (ref 0–2)
BILIRUBIN,DIRECT: 0 MG/DL (ref 0–0.2)
BILIRUBIN,TOTAL: 0.4 MG/DL (ref 0.2–1.3)
BLOOD UREA NITROGEN: 11 MG/DL (ref 7–20)
CALCIUM: 8.8 MG/DL (ref 8.4–10.2)
CARBON DIOXIDE: 27 MMOL/L (ref 21–31)
CHLORIDE: 110 MMOL/L (ref 97–110)
CREATININE: 0.67 MG/DL (ref 0.44–1)
EOSINOPHILS #: 0 10^3/UL (ref 0–0.5)
EOSINOPHILS %: 0.2 % (ref 0–7)
GLOBULIN: 2.8 G/DL (ref 1.3–3.2)
GLUCOSE: 92 MG/DL (ref 70–220)
HEMATOCRIT: 29.8 % (ref 37–47)
HEMATOCRIT: 32.6 % (ref 35–45)
HEMOGLOBIN: 8.8 G/DL (ref 12–16)
HEMOGLOBIN: 9 G/DL (ref 11.7–15.5)
IMMATURE GRANS #M: 0.05 10^3/UL (ref 0–0.03)
IMMATURE GRANS % (M): 0.6 % (ref 0–0.43)
LYMPHOCYTES #: 2.1 10^3/UL (ref 0.8–2.9)
LYMPHOCYTES %: 23.3 % (ref 15–51)
MAGNESIUM: 2.3 MG/DL (ref 1.7–2.5)
MCH: 18.5 PG (ref 27–33)
MCV: 66.9 FL (ref 80–100)
MEAN CORPUSCULAR HEMOGLOBIN: 18.7 PG (ref 29–33)
MEAN CORPUSCULAR HGB CONC: 29.5 G/DL (ref 32–37)
MEAN CORPUSCULAR VOLUME: 63.4 FL (ref 82–101)
MEAN PLATELET VOLUME: 10 FL (ref 7.4–10.4)
MONOCYTE #: 0.8 10^3/UL (ref 0.3–0.9)
MONOCYTES %: 9.2 % (ref 0–11)
NEUTROPHIL #: 5.9 10^3/UL (ref 1.6–7.5)
NEUTROPHILS %: 66.3 % (ref 39–77)
NUCLEATED RED BLOOD CELLS #: 0 10^3/UL (ref 0–0)
NUCLEATED RED BLOOD CELLS%: 0 /100WBC (ref 0–0)
PHOSPHORUS: 4.2 MG/DL (ref 2.5–4.9)
PLATELET COUNT: 482 10^3/UL (ref 140–415)
POSITIVE DIFF: (no result)
POTASSIUM: 4.2 MMOL/L (ref 3.5–5.1)
PROTEIN, TOTAL: 6.4 G/DL (ref 6.1–8.1)
RDW: 21.6 % (ref 11–15)
RED BLOOD CELL COUNT: 4.87 MILLION/UL (ref 3.8–5.1)
RED BLOOD COUNT: 4.7 10^6/UL (ref 4.2–5.4)
RED CELL DISTRIBUTION WIDTH: 23.5 % (ref 11.5–14.5)
SODIUM: 142 MMOL/L (ref 135–144)
TOTAL PROTEIN: 6.3 G/DL (ref 6.1–8.1)
WHITE BLOOD COUNT: 8.9 10^3/UL (ref 4.8–10.8)

## 2018-09-09 RX ADMIN — BISACODYL 1 MG: 5 TABLET, COATED ORAL at 16:27

## 2018-09-09 RX ADMIN — POLYETHYLENE GLYCOL 3350 1 GM: 17 POWDER, FOR SOLUTION ORAL at 09:06

## 2018-09-09 RX ADMIN — PANTOPRAZOLE SODIUM 1 MG: 40 INJECTION, POWDER, FOR SOLUTION INTRAVENOUS at 17:40

## 2018-09-09 RX ADMIN — PANTOPRAZOLE SODIUM 1 MG: 40 INJECTION, POWDER, FOR SOLUTION INTRAVENOUS at 05:57

## 2018-09-09 RX ADMIN — SODIUM FERRIC GLUCONATE COMPLEX 1 MLS/HR: 12.5 INJECTION INTRAVENOUS at 16:28

## 2018-09-09 RX ADMIN — SUCRALFATE 1 GM: 1 SUSPENSION ORAL at 21:03

## 2018-09-09 RX ADMIN — SUCRALFATE 1 GM: 1 SUSPENSION ORAL at 09:06

## 2018-09-09 RX ADMIN — POLYETHYLENE GLYCOL 3350 1 GM: 17 POWDER, FOR SOLUTION ORAL at 16:54

## 2018-09-09 RX ADMIN — BISACODYL 1 MG: 5 TABLET, COATED ORAL at 09:06

## 2018-09-09 RX ADMIN — SUCRALFATE 1 GM: 1 SUSPENSION ORAL at 16:27

## 2018-09-09 RX ADMIN — SUCRALFATE 1 GM: 1 SUSPENSION ORAL at 13:01

## 2018-09-09 RX ADMIN — MAGESIUM CITRATE 1 ML: 1.75 LIQUID ORAL at 09:12

## 2018-09-10 LAB
ABNORMAL IP MESSAGE: 1
ADD MAN DIFF?: NO
ANION GAP: 13 (ref 8–16)
BASOPHIL #: 0.1 10^3/UL (ref 0–0.1)
BASOPHILS %: 0.5 % (ref 0–2)
BLOOD UREA NITROGEN: 9 MG/DL (ref 7–20)
CALCIUM: 9.5 MG/DL (ref 8.4–10.2)
CARBON DIOXIDE: 26 MMOL/L (ref 21–31)
CHLORIDE: 107 MMOL/L (ref 97–110)
CREATININE: 0.66 MG/DL (ref 0.44–1)
EOSINOPHILS #: 0 10^3/UL (ref 0–0.5)
EOSINOPHILS %: 0.3 % (ref 0–7)
GLUCOSE: 85 MG/DL (ref 70–220)
HEMATOCRIT: 30.4 % (ref 37–47)
HEMOGLOBIN: 8.9 G/DL (ref 12–16)
IMMATURE GRANS #M: 0.03 10^3/UL (ref 0–0.03)
IMMATURE GRANS % (M): 0.3 % (ref 0–0.43)
LYMPHOCYTES #: 1.9 10^3/UL (ref 0.8–2.9)
LYMPHOCYTES %: 20.3 % (ref 15–51)
MAGNESIUM: 2.3 MG/DL (ref 1.7–2.5)
MEAN CORPUSCULAR HEMOGLOBIN: 18.7 PG (ref 29–33)
MEAN CORPUSCULAR HGB CONC: 29.3 G/DL (ref 32–37)
MEAN CORPUSCULAR VOLUME: 64 FL (ref 82–101)
MEAN PLATELET VOLUME: 9.4 FL (ref 7.4–10.4)
MONOCYTE #: 0.9 10^3/UL (ref 0.3–0.9)
MONOCYTES %: 9 % (ref 0–11)
NEUTROPHIL #: 6.6 10^3/UL (ref 1.6–7.5)
NEUTROPHILS %: 69.6 % (ref 39–77)
NUCLEATED RED BLOOD CELLS #: 0 10^3/UL (ref 0–0)
NUCLEATED RED BLOOD CELLS%: 0 /100WBC (ref 0–0)
PHOSPHORUS: 4.9 MG/DL (ref 2.5–4.9)
PLATELET COUNT: 483 10^3/UL (ref 140–415)
POSITIVE DIFF: (no result)
POTASSIUM: 4.3 MMOL/L (ref 3.5–5.1)
RED BLOOD COUNT: 4.75 10^6/UL (ref 4.2–5.4)
RED CELL DISTRIBUTION WIDTH: 24.7 % (ref 11.5–14.5)
SODIUM: 142 MMOL/L (ref 135–144)
WHITE BLOOD COUNT: 9.5 10^3/UL (ref 4.8–10.8)

## 2018-09-10 PROCEDURE — 0DB98ZX EXCISION OF DUODENUM, VIA NATURAL OR ARTIFICIAL OPENING ENDOSCOPIC, DIAGNOSTIC: ICD-10-PCS

## 2018-09-10 PROCEDURE — 0DB68ZX EXCISION OF STOMACH, VIA NATURAL OR ARTIFICIAL OPENING ENDOSCOPIC, DIAGNOSTIC: ICD-10-PCS

## 2018-09-10 PROCEDURE — 30233N1 TRANSFUSION OF NONAUTOLOGOUS RED BLOOD CELLS INTO PERIPHERAL VEIN, PERCUTANEOUS APPROACH: ICD-10-PCS

## 2018-09-10 PROCEDURE — 0DJD8ZZ INSPECTION OF LOWER INTESTINAL TRACT, VIA NATURAL OR ARTIFICIAL OPENING ENDOSCOPIC: ICD-10-PCS

## 2018-09-10 RX ADMIN — PROPOFOL 1: 10 INJECTION, EMULSION INTRAVENOUS at 16:45

## 2018-09-10 RX ADMIN — SUCRALFATE 1 GM: 1 SUSPENSION ORAL at 09:00

## 2018-09-10 RX ADMIN — SODIUM FERRIC GLUCONATE COMPLEX 1 MLS/HR: 12.5 INJECTION INTRAVENOUS at 16:49

## 2018-09-10 RX ADMIN — PROPOFOL 1: 10 INJECTION, EMULSION INTRAVENOUS at 15:34

## 2018-09-10 RX ADMIN — SUCRALFATE 1 GM: 1 SUSPENSION ORAL at 17:28

## 2018-09-10 RX ADMIN — PANTOPRAZOLE SODIUM 1 MG: 40 INJECTION, POWDER, FOR SOLUTION INTRAVENOUS at 17:28

## 2018-09-10 RX ADMIN — SUCRALFATE 1 GM: 1 SUSPENSION ORAL at 21:26

## 2018-09-10 RX ADMIN — SUCRALFATE 1 GM: 1 SUSPENSION ORAL at 12:09

## 2018-09-10 RX ADMIN — PANTOPRAZOLE SODIUM 1 MG: 40 INJECTION, POWDER, FOR SOLUTION INTRAVENOUS at 05:24

## 2018-09-11 LAB
ABNORMAL IP MESSAGE: 1
ADD MAN DIFF?: NO
ALBUMIN: 3.6 G/DL (ref 3.8–4.8)
ALPHA-1-GLOBULINS: 0.4 G/DL (ref 0.2–0.3)
ALPHA-2-GLOBULINS: 0.7 G/DL (ref 0.5–0.9)
BASOPHIL #: 0 10^3/UL (ref 0–0.1)
BASOPHILS %: 0.4 % (ref 0–2)
BETA 2 GLOBULINS: 0.4 G/DL (ref 0.2–0.5)
BETA GLOBULINS: 0.6 G/DL (ref 0.4–0.6)
EOSINOPHILS #: 0 10^3/UL (ref 0–0.5)
EOSINOPHILS %: 0.3 % (ref 0–7)
GAMMA GLOBULINS: 0.9 G/DL (ref 0.8–1.7)
HAPTOGLOBIN: 253 MG/DL (ref 43–212)
HEMATOCRIT: 33 % (ref 37–47)
HEMOGLOBIN A2 (QUANT): 2 % (ref 1.8–3.5)
HEMOGLOBIN A: 98 % (ref 96–?)
HEMOGLOBIN F: <1 % (ref ?–2)
HEMOGLOBIN: 9.3 G/DL (ref 12–16)
HEMOGLOBINOPATHY INTERP: (no result)
IMMATURE GRANS #M: 0.05 10^3/UL (ref 0–0.03)
IMMATURE GRANS % (M): 0.6 % (ref 0–0.43)
LYMPHOCYTES #: 1.8 10^3/UL (ref 0.8–2.9)
LYMPHOCYTES %: 23.4 % (ref 15–51)
MEAN CORPUSCULAR HEMOGLOBIN: 18.5 PG (ref 29–33)
MEAN CORPUSCULAR HGB CONC: 28.2 G/DL (ref 32–37)
MEAN CORPUSCULAR VOLUME: 65.6 FL (ref 82–101)
MEAN PLATELET VOLUME: 9.7 FL (ref 7.4–10.4)
MONOCYTE #: 0.5 10^3/UL (ref 0.3–0.9)
MONOCYTES %: 6 % (ref 0–11)
NEUTROPHIL #: 5.5 10^3/UL (ref 1.6–7.5)
NEUTROPHILS %: 69.3 % (ref 39–77)
NUCLEATED RED BLOOD CELLS #: 0 10^3/UL (ref 0–0)
NUCLEATED RED BLOOD CELLS%: 0 /100WBC (ref 0–0)
PLATELET COUNT: 509 10^3/UL (ref 140–415)
POSITIVE DIFF: (no result)
PROE INTERPRETATION: (no result)
RED BLOOD COUNT: 5.03 10^6/UL (ref 4.2–5.4)
RED CELL DISTRIBUTION WIDTH: 25.4 % (ref 11.5–14.5)
WHITE BLOOD COUNT: 7.9 10^3/UL (ref 4.8–10.8)

## 2018-09-11 RX ADMIN — PANTOPRAZOLE SODIUM 1 MG: 40 INJECTION, POWDER, FOR SOLUTION INTRAVENOUS at 05:29

## 2018-09-11 RX ADMIN — SUCRALFATE 1 GM: 1 SUSPENSION ORAL at 13:39

## 2018-09-11 RX ADMIN — SUCRALFATE 1 GM: 1 SUSPENSION ORAL at 08:21
